# Patient Record
Sex: MALE
[De-identification: names, ages, dates, MRNs, and addresses within clinical notes are randomized per-mention and may not be internally consistent; named-entity substitution may affect disease eponyms.]

---

## 2018-01-09 PROBLEM — Z00.00 ENCOUNTER FOR PREVENTIVE HEALTH EXAMINATION: Status: ACTIVE | Noted: 2018-01-09

## 2018-01-19 ENCOUNTER — APPOINTMENT (OUTPATIENT)
Dept: ORTHOPEDIC SURGERY | Facility: CLINIC | Age: 53
End: 2018-01-19
Payer: OTHER MISCELLANEOUS

## 2018-01-19 VITALS — HEIGHT: 69 IN | WEIGHT: 225 LBS | BODY MASS INDEX: 33.33 KG/M2

## 2018-01-19 DIAGNOSIS — Z78.9 OTHER SPECIFIED HEALTH STATUS: ICD-10-CM

## 2018-01-19 DIAGNOSIS — Z86.39 PERSONAL HISTORY OF OTHER ENDOCRINE, NUTRITIONAL AND METABOLIC DISEASE: ICD-10-CM

## 2018-01-19 DIAGNOSIS — M17.32 UNILATERAL POST-TRAUMATIC OSTEOARTHRITIS, LEFT KNEE: ICD-10-CM

## 2018-01-19 DIAGNOSIS — Z80.9 FAMILY HISTORY OF MALIGNANT NEOPLASM, UNSPECIFIED: ICD-10-CM

## 2018-01-19 DIAGNOSIS — Z82.61 FAMILY HISTORY OF ARTHRITIS: ICD-10-CM

## 2018-01-19 DIAGNOSIS — Z87.39 PERSONAL HISTORY OF OTHER DISEASES OF THE MUSCULOSKELETAL SYSTEM AND CONNECTIVE TISSUE: ICD-10-CM

## 2018-01-19 DIAGNOSIS — M25.562 PAIN IN LEFT KNEE: ICD-10-CM

## 2018-01-19 PROCEDURE — 73564 X-RAY EXAM KNEE 4 OR MORE: CPT | Mod: 50

## 2018-01-19 PROCEDURE — 99204 OFFICE O/P NEW MOD 45 MIN: CPT

## 2018-02-14 ENCOUNTER — OUTPATIENT (OUTPATIENT)
Dept: OUTPATIENT SERVICES | Facility: HOSPITAL | Age: 53
LOS: 1 days | Discharge: HOME | End: 2018-02-14
Payer: OTHER MISCELLANEOUS

## 2018-02-14 VITALS
OXYGEN SATURATION: 96 % | HEIGHT: 69 IN | TEMPERATURE: 98 F | RESPIRATION RATE: 18 BRPM | DIASTOLIC BLOOD PRESSURE: 89 MMHG | WEIGHT: 235.67 LBS | SYSTOLIC BLOOD PRESSURE: 130 MMHG | HEART RATE: 67 BPM

## 2018-02-14 DIAGNOSIS — Z01.818 ENCOUNTER FOR OTHER PREPROCEDURAL EXAMINATION: ICD-10-CM

## 2018-02-14 DIAGNOSIS — Z98.890 OTHER SPECIFIED POSTPROCEDURAL STATES: Chronic | ICD-10-CM

## 2018-02-14 DIAGNOSIS — E66.9 OBESITY, UNSPECIFIED: ICD-10-CM

## 2018-02-14 DIAGNOSIS — M17.12 UNILATERAL PRIMARY OSTEOARTHRITIS, LEFT KNEE: ICD-10-CM

## 2018-02-14 DIAGNOSIS — Z90.49 ACQUIRED ABSENCE OF OTHER SPECIFIED PARTS OF DIGESTIVE TRACT: Chronic | ICD-10-CM

## 2018-02-14 DIAGNOSIS — M17.11 UNILATERAL PRIMARY OSTEOARTHRITIS, RIGHT KNEE: ICD-10-CM

## 2018-02-14 LAB
ANION GAP SERPL CALC-SCNC: 8 MMOL/L — SIGNIFICANT CHANGE UP (ref 5–17)
APTT BLD: 29.5 SEC — SIGNIFICANT CHANGE UP (ref 27.5–37.4)
BASOPHILS # BLD AUTO: 0.05 K/UL — SIGNIFICANT CHANGE UP (ref 0–0.2)
BASOPHILS NFR BLD AUTO: 0.8 % — SIGNIFICANT CHANGE UP (ref 0–2)
BUN SERPL-MCNC: 17 MG/DL — SIGNIFICANT CHANGE UP (ref 7–23)
CALCIUM SERPL-MCNC: 9.3 MG/DL — SIGNIFICANT CHANGE UP (ref 8.5–10.1)
CHLORIDE SERPL-SCNC: 104 MMOL/L — SIGNIFICANT CHANGE UP (ref 96–108)
CO2 SERPL-SCNC: 27 MMOL/L — SIGNIFICANT CHANGE UP (ref 22–31)
CREAT SERPL-MCNC: 0.92 MG/DL — SIGNIFICANT CHANGE UP (ref 0.5–1.3)
EOSINOPHIL # BLD AUTO: 0.19 K/UL — SIGNIFICANT CHANGE UP (ref 0–0.5)
EOSINOPHIL NFR BLD AUTO: 2.9 % — SIGNIFICANT CHANGE UP (ref 0–6)
GLUCOSE SERPL-MCNC: 90 MG/DL — SIGNIFICANT CHANGE UP (ref 70–99)
HBA1C BLD-MCNC: 5.3 % — SIGNIFICANT CHANGE UP (ref 4–5.6)
HCT VFR BLD CALC: 46.2 % — SIGNIFICANT CHANGE UP (ref 39–50)
HGB BLD-MCNC: 16.2 G/DL — SIGNIFICANT CHANGE UP (ref 13–17)
IMM GRANULOCYTES NFR BLD AUTO: 0.6 % — SIGNIFICANT CHANGE UP (ref 0–1.5)
INR BLD: 0.92 RATIO — SIGNIFICANT CHANGE UP (ref 0.88–1.16)
LYMPHOCYTES # BLD AUTO: 1.61 K/UL — SIGNIFICANT CHANGE UP (ref 1–3.3)
LYMPHOCYTES # BLD AUTO: 24.7 % — SIGNIFICANT CHANGE UP (ref 13–44)
MCHC RBC-ENTMCNC: 29.7 PG — SIGNIFICANT CHANGE UP (ref 27–34)
MCHC RBC-ENTMCNC: 35.1 GM/DL — SIGNIFICANT CHANGE UP (ref 32–36)
MCV RBC AUTO: 84.8 FL — SIGNIFICANT CHANGE UP (ref 80–100)
MONOCYTES # BLD AUTO: 0.54 K/UL — SIGNIFICANT CHANGE UP (ref 0–0.9)
MONOCYTES NFR BLD AUTO: 8.3 % — SIGNIFICANT CHANGE UP (ref 2–14)
NEUTROPHILS # BLD AUTO: 4.1 K/UL — SIGNIFICANT CHANGE UP (ref 1.8–7.4)
NEUTROPHILS NFR BLD AUTO: 62.7 % — SIGNIFICANT CHANGE UP (ref 43–77)
PLATELET # BLD AUTO: 232 K/UL — SIGNIFICANT CHANGE UP (ref 150–400)
POTASSIUM SERPL-MCNC: 4.2 MMOL/L — SIGNIFICANT CHANGE UP (ref 3.5–5.3)
POTASSIUM SERPL-SCNC: 4.2 MMOL/L — SIGNIFICANT CHANGE UP (ref 3.5–5.3)
PROTHROM AB SERPL-ACNC: 10 SEC — SIGNIFICANT CHANGE UP (ref 9.8–12.7)
RBC # BLD: 5.45 M/UL — SIGNIFICANT CHANGE UP (ref 4.2–5.8)
RBC # FLD: 13.2 % — SIGNIFICANT CHANGE UP (ref 10.3–14.5)
SODIUM SERPL-SCNC: 139 MMOL/L — SIGNIFICANT CHANGE UP (ref 135–145)
WBC # BLD: 6.53 K/UL — SIGNIFICANT CHANGE UP (ref 3.8–10.5)
WBC # FLD AUTO: 6.53 K/UL — SIGNIFICANT CHANGE UP (ref 3.8–10.5)

## 2018-02-14 PROCEDURE — 93010 ELECTROCARDIOGRAM REPORT: CPT | Mod: NC

## 2018-02-14 NOTE — H&P PST ADULT - NSANTHOSAYNRD_GEN_A_CORE
No. MODESTO screening performed.  STOP BANG Legend: 0-2 = LOW Risk; 3-4 = INTERMEDIATE Risk; 5-8 = HIGH Risk

## 2018-02-14 NOTE — OCCUPATIONAL THERAPY INITIAL EVALUATION ADULT - FINE MOTOR COORDINATION, FINE MOTOR COORDINATION TESTS, OT EVAL
Patient specific functional scale: Patient specific scoring scheme: 0 (unable to perform activity) ---- 5 ---- 10 (Able to perform activity at the same level as before injury or problem.) Activity: Standing ____7___, Activity: Walking ____8_____

## 2018-02-14 NOTE — H&P PST ADULT - FAMILY HISTORY
Mother  Still living? Yes, Estimated age: Age Unknown  Diabetes mellitus, Age at diagnosis: Age Unknown     Father  Still living? No  Family history of lung cancer, Age at diagnosis: Age Unknown

## 2018-02-14 NOTE — H&P PST ADULT - LYMPHATIC
anterior cervical L/supraclavicular R/posterior cervical L/posterior cervical R/anterior cervical R/supraclavicular L

## 2018-02-14 NOTE — H&P PST ADULT - HISTORY OF PRESENT ILLNESS
51yo male denies significant medical history report 2016 he injured Left knee, and had arthroscopy procedure, 1/2017, however reports still experiences left knee pain. Pt presents today for presurgical testing for Left Total Knee Replacement scheduled for 2/28/2018.
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
## 2018-02-14 NOTE — H&P PST ADULT - PROBLEM SELECTOR PLAN 1
Left Total Knee Replacement scheduled for 2/28/2018. Left Total Knee Replacement scheduled for 2/28/2018.  Pre-op instructions given by RN. Pt verbalized understanding  Chlorhexidine wash instructions given   Nasal culture instructions given  Medical clearance requested - pending same

## 2018-02-14 NOTE — OCCUPATIONAL THERAPY INITIAL EVALUATION ADULT - ADDITIONAL COMMENTS
Patient lives in a private house with a 3 steps to enter with bilateral handrails. Once inside, the patient main bedroom and bathroom is on the first floor. The patients bathroom has a tub/shower combination with a low toilet and no DME inside of the bathroom. The patients ambulates with no device and does not own any devices for ambulation. The patient is R handed, wears glasses for reading and drives. the patient daily pain is 7/10 and the patient uses alieve on bad days.

## 2018-02-14 NOTE — PHYSICAL THERAPY INITIAL EVALUATION ADULT - CRITERIA FOR SKILLED THERAPEUTIC INTERVENTIONS
rehab potential/therapy frequency/anticipated equipment needs at discharge/anticipated discharge recommendation/impairments found/functional limitations in following categories/predicted duration of therapy intervention

## 2018-02-14 NOTE — OCCUPATIONAL THERAPY INITIAL EVALUATION ADULT - MODIFIED CLINICAL TEST OF SENSORY INTEGRATION IN BALANCE TEST
Barthel Index: Feeding Score___10___, Bathing Score___5___, Grooming Score___5__, Dressing Score__10___, Bowel Score__10___, Bladder Score__10____, Toilet Score__10___, Transfer Score___15___, Mobility Score__15___, Stairs Score__10___, Total Score__100___.

## 2018-02-14 NOTE — H&P PST ADULT - MUSCULOSKELETAL
details… detailed exam no joint warmth/normal strength/decreased ROM due to pain/no joint swelling/no joint erythema/no calf tenderness

## 2018-02-14 NOTE — OCCUPATIONAL THERAPY INITIAL EVALUATION ADULT - TRANSFER SAFETY CONCERNS NOTED: SIT/STAND, REHAB EVAL
decreased balance during turns/decreased step length/decreased sequencing ability/decreased weight-shifting ability

## 2018-02-14 NOTE — PHYSICAL THERAPY INITIAL EVALUATION ADULT - MODIFIED CLINICAL TEST OF SENSORY INTEGRATION IN BALANCE TEST
Barthel Index 100 /100. 5x Sit to Stand Test : 15 seconds, 2 Minute Walk Test : 530 feet [id number] assist device.

## 2018-02-14 NOTE — H&P PST ADULT - ATTENDING COMMENTS
Pt has advanced OA on X-ray.  They have failed all forms of conservative treatment including PT and Meds and Injections as necessary.  They are her for Total Joint Replacement.

## 2018-02-15 LAB
MRSA PCR RESULT.: SIGNIFICANT CHANGE UP
S AUREUS DNA NOSE QL NAA+PROBE: DETECTED

## 2018-02-16 RX ORDER — MUPIROCIN 20 MG/G
1 OINTMENT TOPICAL
Qty: 1 | Refills: 0 | OUTPATIENT
Start: 2018-02-16 | End: 2018-02-20

## 2018-02-27 RX ORDER — OXYCODONE HYDROCHLORIDE 5 MG/1
10 TABLET ORAL EVERY 4 HOURS
Qty: 0 | Refills: 0 | Status: DISCONTINUED | OUTPATIENT
Start: 2018-02-28 | End: 2018-03-01

## 2018-02-27 RX ORDER — OXYCODONE HYDROCHLORIDE 5 MG/1
20 TABLET ORAL ONCE
Qty: 0 | Refills: 0 | Status: DISCONTINUED | OUTPATIENT
Start: 2018-02-28 | End: 2018-02-28

## 2018-02-27 RX ORDER — OXYCODONE HYDROCHLORIDE 5 MG/1
5 TABLET ORAL EVERY 4 HOURS
Qty: 0 | Refills: 0 | Status: DISCONTINUED | OUTPATIENT
Start: 2018-02-28 | End: 2018-03-01

## 2018-02-27 RX ORDER — CELECOXIB 200 MG/1
200 CAPSULE ORAL
Qty: 0 | Refills: 0 | Status: DISCONTINUED | OUTPATIENT
Start: 2018-03-01 | End: 2018-03-01

## 2018-02-27 RX ORDER — ASCORBIC ACID 60 MG
500 TABLET,CHEWABLE ORAL
Qty: 0 | Refills: 0 | Status: DISCONTINUED | OUTPATIENT
Start: 2018-02-28 | End: 2018-03-01

## 2018-02-27 RX ORDER — ONDANSETRON 8 MG/1
4 TABLET, FILM COATED ORAL EVERY 6 HOURS
Qty: 0 | Refills: 0 | Status: DISCONTINUED | OUTPATIENT
Start: 2018-02-28 | End: 2018-03-01

## 2018-02-27 RX ORDER — DOCUSATE SODIUM 100 MG
100 CAPSULE ORAL THREE TIMES A DAY
Qty: 0 | Refills: 0 | Status: DISCONTINUED | OUTPATIENT
Start: 2018-02-28 | End: 2018-03-01

## 2018-02-27 RX ORDER — ACETAMINOPHEN 500 MG
650 TABLET ORAL ONCE
Qty: 0 | Refills: 0 | Status: COMPLETED | OUTPATIENT
Start: 2018-02-28 | End: 2018-02-28

## 2018-02-27 RX ORDER — SENNA PLUS 8.6 MG/1
2 TABLET ORAL AT BEDTIME
Qty: 0 | Refills: 0 | Status: DISCONTINUED | OUTPATIENT
Start: 2018-02-28 | End: 2018-03-01

## 2018-02-27 RX ORDER — SODIUM CHLORIDE 9 MG/ML
1000 INJECTION, SOLUTION INTRAVENOUS
Qty: 0 | Refills: 0 | Status: DISCONTINUED | OUTPATIENT
Start: 2018-02-28 | End: 2018-03-01

## 2018-02-27 RX ORDER — MAGNESIUM HYDROXIDE 400 MG/1
30 TABLET, CHEWABLE ORAL DAILY
Qty: 0 | Refills: 0 | Status: DISCONTINUED | OUTPATIENT
Start: 2018-02-28 | End: 2018-03-01

## 2018-02-27 RX ORDER — POLYETHYLENE GLYCOL 3350 17 G/17G
17 POWDER, FOR SOLUTION ORAL DAILY
Qty: 0 | Refills: 0 | Status: DISCONTINUED | OUTPATIENT
Start: 2018-02-28 | End: 2018-03-01

## 2018-02-27 RX ORDER — ASPIRIN/CALCIUM CARB/MAGNESIUM 324 MG
325 TABLET ORAL
Qty: 0 | Refills: 0 | Status: DISCONTINUED | OUTPATIENT
Start: 2018-03-01 | End: 2018-03-01

## 2018-02-27 RX ORDER — MORPHINE SULFATE 50 MG/1
4 CAPSULE, EXTENDED RELEASE ORAL ONCE
Qty: 0 | Refills: 0 | Status: DISCONTINUED | OUTPATIENT
Start: 2018-02-28 | End: 2018-03-01

## 2018-02-27 RX ORDER — PANTOPRAZOLE SODIUM 20 MG/1
40 TABLET, DELAYED RELEASE ORAL DAILY
Qty: 0 | Refills: 0 | Status: DISCONTINUED | OUTPATIENT
Start: 2018-02-28 | End: 2018-03-01

## 2018-02-27 RX ORDER — ACETAMINOPHEN 500 MG
650 TABLET ORAL EVERY 6 HOURS
Qty: 0 | Refills: 0 | Status: DISCONTINUED | OUTPATIENT
Start: 2018-02-28 | End: 2018-03-01

## 2018-02-28 ENCOUNTER — INPATIENT (INPATIENT)
Facility: HOSPITAL | Age: 53
LOS: 0 days | Discharge: HOME HEALTH | End: 2018-03-01
Attending: ORTHOPAEDIC SURGERY | Admitting: ORTHOPAEDIC SURGERY
Payer: OTHER MISCELLANEOUS

## 2018-02-28 ENCOUNTER — TRANSCRIPTION ENCOUNTER (OUTPATIENT)
Age: 53
End: 2018-02-28

## 2018-02-28 ENCOUNTER — RESULT REVIEW (OUTPATIENT)
Age: 53
End: 2018-02-28

## 2018-02-28 VITALS
RESPIRATION RATE: 19 BRPM | OXYGEN SATURATION: 99 % | HEART RATE: 71 BPM | WEIGHT: 225.09 LBS | HEIGHT: 70 IN | DIASTOLIC BLOOD PRESSURE: 70 MMHG | SYSTOLIC BLOOD PRESSURE: 111 MMHG | TEMPERATURE: 97 F

## 2018-02-28 DIAGNOSIS — E55.9 VITAMIN D DEFICIENCY, UNSPECIFIED: ICD-10-CM

## 2018-02-28 DIAGNOSIS — Z98.890 OTHER SPECIFIED POSTPROCEDURAL STATES: Chronic | ICD-10-CM

## 2018-02-28 DIAGNOSIS — E66.9 OBESITY, UNSPECIFIED: ICD-10-CM

## 2018-02-28 DIAGNOSIS — S89.92XA UNSPECIFIED INJURY OF LEFT LOWER LEG, INITIAL ENCOUNTER: ICD-10-CM

## 2018-02-28 DIAGNOSIS — Z90.49 ACQUIRED ABSENCE OF OTHER SPECIFIED PARTS OF DIGESTIVE TRACT: Chronic | ICD-10-CM

## 2018-02-28 LAB
HCT VFR BLD CALC: 44 % — SIGNIFICANT CHANGE UP (ref 39–50)
HGB BLD-MCNC: 15.1 G/DL — SIGNIFICANT CHANGE UP (ref 13–17)
MCHC RBC-ENTMCNC: 29.6 PG — SIGNIFICANT CHANGE UP (ref 27–34)
MCHC RBC-ENTMCNC: 34.3 GM/DL — SIGNIFICANT CHANGE UP (ref 32–36)
MCV RBC AUTO: 86.3 FL — SIGNIFICANT CHANGE UP (ref 80–100)
NRBC # BLD: 0 /100 WBCS — SIGNIFICANT CHANGE UP (ref 0–0)
PLATELET # BLD AUTO: 182 K/UL — SIGNIFICANT CHANGE UP (ref 150–400)
RBC # BLD: 5.1 M/UL — SIGNIFICANT CHANGE UP (ref 4.2–5.8)
RBC # FLD: 12.9 % — SIGNIFICANT CHANGE UP (ref 10.3–14.5)
WBC # BLD: 8.47 K/UL — SIGNIFICANT CHANGE UP (ref 3.8–10.5)
WBC # FLD AUTO: 8.47 K/UL — SIGNIFICANT CHANGE UP (ref 3.8–10.5)

## 2018-02-28 PROCEDURE — 99223 1ST HOSP IP/OBS HIGH 75: CPT

## 2018-02-28 PROCEDURE — 88311 DECALCIFY TISSUE: CPT | Mod: 26

## 2018-02-28 PROCEDURE — 73560 X-RAY EXAM OF KNEE 1 OR 2: CPT | Mod: 26,LT

## 2018-02-28 PROCEDURE — 88305 TISSUE EXAM BY PATHOLOGIST: CPT | Mod: 26

## 2018-02-28 RX ORDER — CHOLECALCIFEROL (VITAMIN D3) 125 MCG
1000 CAPSULE ORAL DAILY
Qty: 0 | Refills: 0 | Status: DISCONTINUED | OUTPATIENT
Start: 2018-02-28 | End: 2018-03-01

## 2018-02-28 RX ORDER — DEXAMETHASONE 0.5 MG/5ML
10 ELIXIR ORAL ONCE
Qty: 0 | Refills: 0 | Status: COMPLETED | OUTPATIENT
Start: 2018-03-01 | End: 2018-03-01

## 2018-02-28 RX ORDER — TRANEXAMIC ACID 100 MG/ML
1000 INJECTION, SOLUTION INTRAVENOUS ONCE
Qty: 0 | Refills: 0 | Status: COMPLETED | OUTPATIENT
Start: 2018-02-28 | End: 2018-02-28

## 2018-02-28 RX ORDER — CEFAZOLIN SODIUM 1 G
2000 VIAL (EA) INJECTION EVERY 8 HOURS
Qty: 0 | Refills: 0 | Status: COMPLETED | OUTPATIENT
Start: 2018-02-28 | End: 2018-03-01

## 2018-02-28 RX ORDER — CELECOXIB 200 MG/1
200 CAPSULE ORAL ONCE
Qty: 0 | Refills: 0 | Status: COMPLETED | OUTPATIENT
Start: 2018-02-28 | End: 2018-02-28

## 2018-02-28 RX ORDER — INFLUENZA VIRUS VACCINE 15; 15; 15; 15 UG/.5ML; UG/.5ML; UG/.5ML; UG/.5ML
0.5 SUSPENSION INTRAMUSCULAR ONCE
Qty: 0 | Refills: 0 | Status: DISCONTINUED | OUTPATIENT
Start: 2018-02-28 | End: 2018-03-01

## 2018-02-28 RX ORDER — ACETAMINOPHEN 500 MG
1000 TABLET ORAL ONCE
Qty: 0 | Refills: 0 | Status: DISCONTINUED | OUTPATIENT
Start: 2018-02-28 | End: 2018-03-01

## 2018-02-28 RX ORDER — SODIUM CHLORIDE 9 MG/ML
1000 INJECTION, SOLUTION INTRAVENOUS
Qty: 0 | Refills: 0 | Status: DISCONTINUED | OUTPATIENT
Start: 2018-02-28 | End: 2018-02-28

## 2018-02-28 RX ADMIN — OXYCODONE HYDROCHLORIDE 10 MILLIGRAM(S): 5 TABLET ORAL at 22:37

## 2018-02-28 RX ADMIN — Medication 100 MILLIGRAM(S): at 18:37

## 2018-02-28 RX ADMIN — Medication 650 MILLIGRAM(S): at 10:58

## 2018-02-28 RX ADMIN — Medication 650 MILLIGRAM(S): at 11:00

## 2018-02-28 RX ADMIN — Medication 500 MILLIGRAM(S): at 17:08

## 2018-02-28 RX ADMIN — OXYCODONE HYDROCHLORIDE 10 MILLIGRAM(S): 5 TABLET ORAL at 17:08

## 2018-02-28 RX ADMIN — Medication 1 TABLET(S): at 17:08

## 2018-02-28 RX ADMIN — CELECOXIB 200 MILLIGRAM(S): 200 CAPSULE ORAL at 10:59

## 2018-02-28 RX ADMIN — OXYCODONE HYDROCHLORIDE 10 MILLIGRAM(S): 5 TABLET ORAL at 21:37

## 2018-02-28 RX ADMIN — OXYCODONE HYDROCHLORIDE 10 MILLIGRAM(S): 5 TABLET ORAL at 18:08

## 2018-02-28 RX ADMIN — CELECOXIB 200 MILLIGRAM(S): 200 CAPSULE ORAL at 11:01

## 2018-02-28 RX ADMIN — OXYCODONE HYDROCHLORIDE 20 MILLIGRAM(S): 5 TABLET ORAL at 11:01

## 2018-02-28 RX ADMIN — SODIUM CHLORIDE 125 MILLILITER(S): 9 INJECTION, SOLUTION INTRAVENOUS at 14:54

## 2018-02-28 RX ADMIN — PANTOPRAZOLE SODIUM 40 MILLIGRAM(S): 20 TABLET, DELAYED RELEASE ORAL at 17:08

## 2018-02-28 RX ADMIN — OXYCODONE HYDROCHLORIDE 20 MILLIGRAM(S): 5 TABLET ORAL at 10:59

## 2018-02-28 RX ADMIN — Medication 100 MILLIGRAM(S): at 21:51

## 2018-02-28 RX ADMIN — TRANEXAMIC ACID 220 MILLIGRAM(S): 100 INJECTION, SOLUTION INTRAVENOUS at 14:54

## 2018-02-28 NOTE — PROGRESS NOTE ADULT - SUBJECTIVE AND OBJECTIVE BOX
52yMale s/p L TKA POD#0. Pt seen and examined in NAD. Pain controlled. Pt denies any new complaints. Pt denies CP/SOB/N/V/D/numbness/tingling/bowel or bladder dysfunction. +void     PE:   LLE: dressing c/d/i. +ROM ankle/toes. Calf: soft, compressible and nontender. DP/PT 2+ NVI.                           15.1   8.47  )-----------( 182      ( 28 Feb 2018 14:24 )             44.0         A/P: 52yMale s/p [sx].  Pain controlled  PT: WBAT   DVT ppx: SCDs/ASA 325mg BID   Wound care, Isometric exercises, incentive spirometry   Discharge: planning for home tomorrow  All the above discussed and understood by pt

## 2018-02-28 NOTE — BRIEF OPERATIVE NOTE - PROCEDURE
<<-----Click on this checkbox to enter Procedure Left total knee replacement  02/28/2018    Active  [id number]

## 2018-02-28 NOTE — PHYSICAL THERAPY INITIAL EVALUATION ADULT - GENERAL OBSERVATIONS, REHAB EVAL
Pt on room air. IV. Pt received supine in bed. NAD. Pt left seated in chair with all needs in reach.

## 2018-02-28 NOTE — PHYSICAL THERAPY INITIAL EVALUATION ADULT - MODIFIED CLINICAL TEST OF SENSORY INTEGRATION IN BALANCE TEST
Barthel Index: Feeding Score 10, Bathing Score 5, Grooming Score 5, Dressing Score 5, Bowels Score 10, Bladder Score 10, Toilet Score 10, Transfers Score 10 Mobility Score 10, Stairs Score 5,     Total Score 80
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
## 2018-02-28 NOTE — PHYSICAL THERAPY INITIAL EVALUATION ADULT - MANUAL MUSCLE TESTING RESULTS, REHAB EVAL
Bilateral UE/ R LE =5/5  L ankle dorsiflexion-plantarflexion 5/5. L knee extension 3/5, not formally tested due to pain.

## 2018-02-28 NOTE — PHYSICAL THERAPY INITIAL EVALUATION ADULT - ACTIVE RANGE OF MOTION EXAMINATION, REHAB EVAL
L knee ROM -10 - 95 deg./RLE Active ROM was WNL (within normal limits)/geno. upper extremity Active ROM was WNL (within normal limits)

## 2018-03-01 ENCOUNTER — TRANSCRIPTION ENCOUNTER (OUTPATIENT)
Age: 53
End: 2018-03-01

## 2018-03-01 VITALS
HEART RATE: 84 BPM | SYSTOLIC BLOOD PRESSURE: 119 MMHG | OXYGEN SATURATION: 94 % | DIASTOLIC BLOOD PRESSURE: 61 MMHG | RESPIRATION RATE: 15 BRPM | TEMPERATURE: 98 F

## 2018-03-01 DIAGNOSIS — D72.828 OTHER ELEVATED WHITE BLOOD CELL COUNT: ICD-10-CM

## 2018-03-01 LAB
ANION GAP SERPL CALC-SCNC: 8 MMOL/L — SIGNIFICANT CHANGE UP (ref 5–17)
BUN SERPL-MCNC: 13 MG/DL — SIGNIFICANT CHANGE UP (ref 7–23)
CALCIUM SERPL-MCNC: 8.4 MG/DL — LOW (ref 8.5–10.1)
CHLORIDE SERPL-SCNC: 104 MMOL/L — SIGNIFICANT CHANGE UP (ref 96–108)
CO2 SERPL-SCNC: 24 MMOL/L — SIGNIFICANT CHANGE UP (ref 22–31)
CREAT SERPL-MCNC: 0.99 MG/DL — SIGNIFICANT CHANGE UP (ref 0.5–1.3)
GLUCOSE SERPL-MCNC: 105 MG/DL — HIGH (ref 70–99)
HCT VFR BLD CALC: 38.3 % — LOW (ref 39–50)
HGB BLD-MCNC: 13.3 G/DL — SIGNIFICANT CHANGE UP (ref 13–17)
MCHC RBC-ENTMCNC: 29.4 PG — SIGNIFICANT CHANGE UP (ref 27–34)
MCHC RBC-ENTMCNC: 34.7 GM/DL — SIGNIFICANT CHANGE UP (ref 32–36)
MCV RBC AUTO: 84.7 FL — SIGNIFICANT CHANGE UP (ref 80–100)
NRBC # BLD: 0 /100 WBCS — SIGNIFICANT CHANGE UP (ref 0–0)
PLATELET # BLD AUTO: 213 K/UL — SIGNIFICANT CHANGE UP (ref 150–400)
POTASSIUM SERPL-MCNC: 4.1 MMOL/L — SIGNIFICANT CHANGE UP (ref 3.5–5.3)
POTASSIUM SERPL-SCNC: 4.1 MMOL/L — SIGNIFICANT CHANGE UP (ref 3.5–5.3)
RBC # BLD: 4.52 M/UL — SIGNIFICANT CHANGE UP (ref 4.2–5.8)
RBC # FLD: 12.7 % — SIGNIFICANT CHANGE UP (ref 10.3–14.5)
SODIUM SERPL-SCNC: 136 MMOL/L — SIGNIFICANT CHANGE UP (ref 135–145)
WBC # BLD: 11.8 K/UL — HIGH (ref 3.8–10.5)
WBC # FLD AUTO: 11.8 K/UL — HIGH (ref 3.8–10.5)

## 2018-03-01 PROCEDURE — 99233 SBSQ HOSP IP/OBS HIGH 50: CPT

## 2018-03-01 RX ORDER — ASCORBIC ACID 60 MG
1 TABLET,CHEWABLE ORAL
Qty: 0 | Refills: 0 | DISCHARGE
Start: 2018-03-01

## 2018-03-01 RX ORDER — ACETAMINOPHEN 500 MG
2 TABLET ORAL
Qty: 0 | Refills: 0 | DISCHARGE
Start: 2018-03-01

## 2018-03-01 RX ORDER — MAGNESIUM HYDROXIDE 400 MG/1
30 TABLET, CHEWABLE ORAL
Qty: 0 | Refills: 0 | DISCHARGE
Start: 2018-03-01

## 2018-03-01 RX ORDER — ASPIRIN/CALCIUM CARB/MAGNESIUM 324 MG
1 TABLET ORAL
Qty: 60 | Refills: 0
Start: 2018-03-01 | End: 2018-03-30

## 2018-03-01 RX ORDER — DOCUSATE SODIUM 100 MG
1 CAPSULE ORAL
Qty: 0 | Refills: 0 | DISCHARGE
Start: 2018-03-01

## 2018-03-01 RX ORDER — OXYCODONE HYDROCHLORIDE 5 MG/1
1 TABLET ORAL
Qty: 30 | Refills: 0
Start: 2018-03-01 | End: 2018-03-05

## 2018-03-01 RX ORDER — CELECOXIB 200 MG/1
1 CAPSULE ORAL
Qty: 60 | Refills: 0
Start: 2018-03-01 | End: 2018-03-30

## 2018-03-01 RX ORDER — PANTOPRAZOLE SODIUM 20 MG/1
1 TABLET, DELAYED RELEASE ORAL
Qty: 30 | Refills: 0
Start: 2018-03-01 | End: 2018-03-30

## 2018-03-01 RX ADMIN — Medication 1000 UNIT(S): at 11:35

## 2018-03-01 RX ADMIN — CELECOXIB 200 MILLIGRAM(S): 200 CAPSULE ORAL at 09:05

## 2018-03-01 RX ADMIN — OXYCODONE HYDROCHLORIDE 5 MILLIGRAM(S): 5 TABLET ORAL at 09:05

## 2018-03-01 RX ADMIN — Medication 1 TABLET(S): at 11:35

## 2018-03-01 RX ADMIN — PANTOPRAZOLE SODIUM 40 MILLIGRAM(S): 20 TABLET, DELAYED RELEASE ORAL at 11:35

## 2018-03-01 RX ADMIN — SODIUM CHLORIDE 100 MILLILITER(S): 9 INJECTION, SOLUTION INTRAVENOUS at 03:07

## 2018-03-01 RX ADMIN — Medication 100 MILLIGRAM(S): at 05:35

## 2018-03-01 RX ADMIN — Medication 100 MILLIGRAM(S): at 03:07

## 2018-03-01 RX ADMIN — CELECOXIB 200 MILLIGRAM(S): 200 CAPSULE ORAL at 15:46

## 2018-03-01 RX ADMIN — OXYCODONE HYDROCHLORIDE 5 MILLIGRAM(S): 5 TABLET ORAL at 15:46

## 2018-03-01 RX ADMIN — Medication 500 MILLIGRAM(S): at 05:35

## 2018-03-01 RX ADMIN — Medication 100 MILLIGRAM(S): at 15:46

## 2018-03-01 RX ADMIN — POLYETHYLENE GLYCOL 3350 17 GRAM(S): 17 POWDER, FOR SOLUTION ORAL at 11:35

## 2018-03-01 RX ADMIN — Medication 102 MILLIGRAM(S): at 05:35

## 2018-03-01 RX ADMIN — Medication 325 MILLIGRAM(S): at 05:35

## 2018-03-01 NOTE — DISCHARGE NOTE ADULT - PATIENT PORTAL LINK FT
You can access the [facility] Patient Portal, offered by [facility], by registering with the following website: [email or web address]

## 2018-03-01 NOTE — OCCUPATIONAL THERAPY INITIAL EVALUATION ADULT - LIVES WITH, PROFILE
in a private house with 4 wide steps to negotiate . Pt living quarters are on the main level. The bathroom has a tub and shower combination , standard toilet and no grab bars/spouse

## 2018-03-01 NOTE — DISCHARGE NOTE ADULT - MEDICATION SUMMARY - MEDICATIONS TO TAKE
I will START or STAY ON the medications listed below when I get home from the hospital:    Marino Red  -- 1 tab(s) by mouth once a day  -- Indication: For LEFT TOTAL KNEE ARTHROPLASTY    acetaminophen 325 mg oral tablet  -- 2 tab(s) by mouth every 6 hours, As needed, For Temp over 38.3 C (100.94 F)  -- Indication: For LEFT TOTAL KNEE ARTHROPLASTY    celecoxib 200 mg oral capsule  -- 1 cap(s) by mouth 2 times a day (before meals) MDD:2 Tabs  -- Indication: For LEFT TOTAL KNEE ARTHROPLASTY    oxyCODONE 10 mg oral tablet  -- 1 tab(s) by mouth every 4 hours, As needed, Moderate Pain MDD:6 Tabs  -- Indication: For LEFT TOTAL KNEE ARTHROPLASTY    aspirin 325 mg oral delayed release tablet  -- 1 tab(s) by mouth 2 times a day MDD:2 Tabs  -- Indication: For LEFT TOTAL KNEE ARTHROPLASTY    magnesium hydroxide 8% oral suspension  -- 30 milliliter(s) by mouth once a day, As needed, Constipation  -- Indication: For LEFT TOTAL KNEE ARTHROPLASTY    docusate sodium 100 mg oral capsule  -- 1 cap(s) by mouth 3 times a day  -- Indication: For LEFT TOTAL KNEE ARTHROPLASTY    CoQ10  -- 1 cap(s) by mouth once a day  -- Indication: For LEFT TOTAL KNEE ARTHROPLASTY    pantoprazole 40 mg oral delayed release tablet  -- 1 tab(s) by mouth once a day MDD:1 Tab  -- Indication: For LEFT TOTAL KNEE ARTHROPLASTY    Multiple Vitamins oral tablet  -- 1 tab(s) by mouth once a day  -- Indication: For LEFT TOTAL KNEE ARTHROPLASTY    Vitamin D3 1000 intl units oral capsule  -- 1 cap(s) by mouth once a day  -- Indication: For LEFT TOTAL KNEE ARTHROPLASTY    ascorbic acid 500 mg oral tablet  -- 1 tab(s) by mouth 2 times a day  -- Indication: For LEFT TOTAL KNEE ARTHROPLASTY

## 2018-03-01 NOTE — DISCHARGE NOTE ADULT - NS AS ACTIVITY OBS
Walking-Indoors allowed/Do not drive or operate machinery/Stairs allowed/Showering allowed/Walking-Outdoors allowed/No Heavy lifting/straining

## 2018-03-01 NOTE — PROGRESS NOTE ADULT - SUBJECTIVE AND OBJECTIVE BOX
Patient is a 52y old  Male who presents with a chief complaint of Left Knee Pain (01 Mar 2018 08:45)      INTERVAL HPI/OVERNIGHT EVENTS:  feels well, pain in adequate control    MEDICATIONS  (STANDING):  acetaminophen  IVPB. 1000 milliGRAM(s) IV Intermittent once  ascorbic acid 500 milliGRAM(s) Oral two times a day  aspirin enteric coated 325 milliGRAM(s) Oral two times a day  celecoxib 200 milliGRAM(s) Oral two times a day before meals  cholecalciferol 1000 Unit(s) Oral daily  docusate sodium 100 milliGRAM(s) Oral three times a day  influenza   Vaccine 0.5 milliLiter(s) IntraMuscular once  lactated ringers. 1000 milliLiter(s) (100 mL/Hr) IV Continuous <Continuous>  morphine  - Injectable 4 milliGRAM(s) IV Push once  multivitamin 1 Tablet(s) Oral daily  pantoprazole    Tablet 40 milliGRAM(s) Oral daily  polyethylene glycol 3350 17 Gram(s) Oral daily    MEDICATIONS  (PRN):  acetaminophen   Tablet 650 milliGRAM(s) Oral every 6 hours PRN For Temp over 38.3 C (100.94 F)  aluminum hydroxide/magnesium hydroxide/simethicone Suspension 30 milliLiter(s) Oral four times a day PRN Indigestion  magnesium hydroxide Suspension 30 milliLiter(s) Oral daily PRN Constipation  ondansetron Injectable 4 milliGRAM(s) IV Push every 6 hours PRN Nausea and/or Vomiting  oxyCODONE    IR 5 milliGRAM(s) Oral every 4 hours PRN Mild Pain  oxyCODONE    IR 10 milliGRAM(s) Oral every 4 hours PRN Moderate Pain  senna 2 Tablet(s) Oral at bedtime PRN Constipation      Allergies    No Known Allergies    Intolerances        REVIEW OF SYSTEMS:  CONSTITUTIONAL: No fever, weight loss, + fatigue  EYES: No eye pain, visual disturbances, or discharge  ENMT:  No difficulty hearing, tinnitus, vertigo; No sinus or throat pain  RESPIRATORY: No cough, wheezing, chills or hemoptysis; No shortness of breath  CARDIOVASCULAR: No chest pain, palpitations, dizziness, or leg swelling  GASTROINTESTINAL: No abdominal or epigastric pain. No nausea, vomiting, or hematemesis; No diarrhea or constipation. No melena or hematochezia.  GENITOURINARY: No dysuria, frequency, hematuria, or incontinence  NEUROLOGICAL: No headaches, memory loss, loss of strength, numbness, or tremors  SKIN: No itching, burning, rashes, or lesions   MUSCULOSKELETAL: No joint pain or swelling; No muscle, back,   left knee pain in control      Vital Signs Last 24 Hrs  T(C): 36.6 (01 Mar 2018 13:42), Max: 37.1 (01 Mar 2018 09:02)  T(F): 97.8 (01 Mar 2018 13:42), Max: 98.8 (01 Mar 2018 09:02)  HR: 84 (01 Mar 2018 13:42) (80 - 109)  BP: 119/61 (01 Mar 2018 13:42) (119/61 - 159/99)  BP(mean): --  RR: 15 (01 Mar 2018 13:42) (15 - 16)  SpO2: 94% (01 Mar 2018 13:42) (93% - 98%)    PHYSICAL EXAM:  GENERAL: NAD, well-groomed, well-developed  HEAD:  Atraumatic, Normocephalic  EYES: EOMI, PERRLA, conjunctiva and sclera clear  ENMT: No tonsillar erythema, exudates, or enlargement; Moist mucous membranes,  NECK: Supple, No JVD, Normal thyroid  NERVOUS SYSTEM:  Alert & Oriented X3, Good concentration; Motor Strength 5/5 B/L upper and lower extremities; DTRs 2+ intact and symmetric  CHEST/LUNG: Clear to percussion bilaterally; No rales, rhonchi, wheezing, or rubs  HEART: Regular rate and rhythm; No murmurs, rubs, or gallops  ABDOMEN: Soft, Nontender, Nondistended; Bowel sounds present  EXTREMITIES:  2+ Peripheral Pulses, left knee c/d/i, mild edema  SKIN: No rashes or lesions    LABS:                        13.3   11.80 )-----------( 213      ( 01 Mar 2018 06:22 )             38.3     03-01    136  |  104  |  13  ----------------------------<  105<H>  4.1   |  24  |  0.99    Ca    8.4<L>      01 Mar 2018 06:22          CAPILLARY BLOOD GLUCOSE          RADIOLOGY & ADDITIONAL TESTS:    Imaging Personally Reviewed:  [ ] YES  [ ] NO    Consultant(s) Notes Reviewed:  [ ] YES  [ ] NO    Care Discussed with Consultants/Other Providers [x ] YES  [ ] NO

## 2018-03-01 NOTE — DISCHARGE NOTE ADULT - ADDITIONAL INSTRUCTIONS
Please call your MD, if you have new onset of fevers, increased drainage, increased pain or increased redness around the incision site. Please return to the Emergency Department if you have chest pain or shortness of breath.

## 2018-03-01 NOTE — DISCHARGE NOTE ADULT - CARE PROVIDER_API CALL
Pacheco Cartagena), Orthopaedic Surgery  [address] [place] [address]  Phone: [phone]  Fax: [phone]

## 2018-03-01 NOTE — OCCUPATIONAL THERAPY INITIAL EVALUATION ADULT - GENERAL OBSERVATIONS, REHAB EVAL
Pt was seen for initial OT consult encountered supine in bed with spouse at bedside. Pt was AA&Ox4, cooperative & followed commands. Left knee and staples are intact . Pt c/o pain, stiffness & decreased ROM in left  knee due to TKR; these deficits limit pt's ADL management, balance & functional mobility.

## 2018-03-01 NOTE — OCCUPATIONAL THERAPY INITIAL EVALUATION ADULT - SOCIAL CONCERNS
Pt voiced concerns  about the side effect of his pain medications. Pt has the support of his wife to assist him upon discharge home./Complex psychosocial needs/coping issues

## 2018-03-01 NOTE — OCCUPATIONAL THERAPY INITIAL EVALUATION ADULT - RANGE OF MOTION EXAMINATION, LOWER EXTREMITY
Right LE Passive ROM was WNL (within normal limits)/AROM in left knee is 0-40 degrees/Right LE Active ROM was WNL(within normal limits)

## 2018-03-01 NOTE — DISCHARGE NOTE ADULT - CARE PLAN
Principal Discharge DX:	Injury of left knee, initial encounter  Goal:	Improve Function, Decrease Pain  Assessment and plan of treatment:	Keep Prineo Dressing Clean, Dry and Intact. May shower with Prineo Dressing. Please do not scrub, soak, peel or pick at the prineo dressing. No creams, lotions, or oils over dressing. May shower and let water run over incision, no baths. Pat dry once out of shower. Dressing to be removed in office at follow up visit in 2 weeks.

## 2018-03-01 NOTE — DISCHARGE NOTE ADULT - MEDICATION SUMMARY - MEDICATIONS TO STOP TAKING
I will STOP taking the medications listed below when I get home from the hospital:    mupirocin 2% topical ointment  -- Apply on skin to affected area 2 times a day   Intranasaly  -- For external use only.

## 2018-03-01 NOTE — OCCUPATIONAL THERAPY INITIAL EVALUATION ADULT - ADDITIONAL COMMENTS
Prior to admission, pt was functioning in his  roles, self sufficient & ambulating independently without any assistive devices.  Presently, pt needs assistance with IADL ; pt is able to dress lower body independently with supervision after set up. The scale below depicts a picture of the pt's current level of functioning. Barthel Index: Feeding Score___10___, Bathing Score__0____, Grooming Score_5____, Dressing Score__10___, Bowel Score__10___, Bladder Score__10____, Toilet Score__10___, Transfer Score____10__, Mobility Score__10___, Stairs Score___5__, Total Score___85/100__.

## 2018-03-01 NOTE — PROGRESS NOTE ADULT - SUBJECTIVE AND OBJECTIVE BOX
POD#1 s/p Left TKA   52yMale Patient seen and examined with Dr. Cartagena, Pain controlled  Patient Denies SOB, CP, N/V/D       PE: Left Knee/LE: Dressing C/D/I, Sensation/motor intact, DP 2+, FROM ankle/toes         B/L LE: Skin intact. +ROM hip/knee/ankle/toes. Ankle Dorsi/plantarflexion: 5/5. Calf: soft, compressible and nontender. DP/PT 2+ NVI.                             13.3   11.80 )-----------( 213      ( 01 Mar 2018 06:22 )             38.3       03-01    136  |  104  |  13  ----------------------------<  105<H>  4.1   |  24  |  0.99    Ca    8.4<L>      01 Mar 2018 06:22          A: As above   P: Pain Control       DVT Prophylaxis      Incentive spirometry      PT WBAT LLE      Isometric exercises      Discharge Planning      All the above discussed and understood by pt       Ortho to F/U

## 2018-03-01 NOTE — OCCUPATIONAL THERAPY INITIAL EVALUATION ADULT - PLANNED THERAPY INTERVENTIONS, OT EVAL
neuromuscular re-education/IADL retraining/bed mobility training/parent/caregiver training.../strengthening/ROM/transfer training/energy conservation techniques/ADL retraining/balance training

## 2018-03-01 NOTE — DISCHARGE NOTE ADULT - PLAN OF CARE
Improve Function, Decrease Pain Keep Prineo Dressing Clean, Dry and Intact. May shower with Prineo Dressing. Please do not scrub, soak, peel or pick at the prineo dressing. No creams, lotions, or oils over dressing. May shower and let water run over incision, no baths. Pat dry once out of shower. Dressing to be removed in office at follow up visit in 2 weeks.

## 2018-03-01 NOTE — DISCHARGE NOTE ADULT - HOSPITAL COURSE
52yMale with history of Left Knee Pain presenting for Left TKA by Dr. Cartagena on 2/28/18. Risk and benefits of surgery were explained to the patient. The patient understood and agreed to proceed with surgery. Patient underwent the procedure with no intraoperative complications. Pt was brought in stable condition to the PACU. Once stable in PACU, pt was brought to the floor. During hospital stay pt was followed by Medicine, [social work or home care] during this admission. Pt had an uneventful hospital course. Patient Discharged Home with Home Care Services and PT

## 2018-03-05 DIAGNOSIS — M17.12 UNILATERAL PRIMARY OSTEOARTHRITIS, LEFT KNEE: ICD-10-CM

## 2018-03-05 DIAGNOSIS — Z80.2 FAMILY HISTORY OF MALIGNANT NEOPLASM OF OTHER RESPIRATORY AND INTRATHORACIC ORGANS: ICD-10-CM

## 2018-03-05 DIAGNOSIS — M51.26 OTHER INTERVERTEBRAL DISC DISPLACEMENT, LUMBAR REGION: ICD-10-CM

## 2018-03-05 DIAGNOSIS — E78.5 HYPERLIPIDEMIA, UNSPECIFIED: ICD-10-CM

## 2018-03-05 DIAGNOSIS — Z96.698 PRESENCE OF OTHER ORTHOPEDIC JOINT IMPLANTS: ICD-10-CM

## 2018-03-05 DIAGNOSIS — J30.2 OTHER SEASONAL ALLERGIC RHINITIS: ICD-10-CM

## 2018-03-05 DIAGNOSIS — E66.9 OBESITY, UNSPECIFIED: ICD-10-CM

## 2018-03-05 DIAGNOSIS — Z90.49 ACQUIRED ABSENCE OF OTHER SPECIFIED PARTS OF DIGESTIVE TRACT: ICD-10-CM

## 2018-03-05 DIAGNOSIS — M54.89 OTHER DORSALGIA: ICD-10-CM

## 2018-03-05 DIAGNOSIS — Z82.2 FAMILY HISTORY OF DEAFNESS AND HEARING LOSS: ICD-10-CM

## 2018-03-05 LAB — SURGICAL PATHOLOGY FINAL REPORT - CH: SIGNIFICANT CHANGE UP

## 2020-03-02 NOTE — H&P PST ADULT - PULMONARY EMBOLUS
Subjective   Patient ID: Yung is a 19 month old male who is accompanied by:mother     Chief Complaint   Patient presents with   • Follow-up     ear infection and pink eye   • Rash     taking Amox        Developed rash and diarrhea after 2 doses of Augmentin given by urgent care 2 days ago.for otitis media.his rash diappered after mother stopped medication and gave Benadryl.    Review of Systems   Gastrointestinal: Positive for diarrhea.   Skin: Positive for rash.   All other systems reviewed and are negative.      Patient's medications, allergies, past medical, surgical, social and family histories were reviewed and updated as appropriate.    Objective   Vitals:Temp 97.8 °F (36.6 °C) (Temporal)   Wt 13.5 kg (29 lb 11.2 oz)   Physical Exam   Constitutional: He is active.   HENT:   Right Ear: Tympanic membrane normal.   Left Ear: Tympanic membrane normal.   Nose: No nasal discharge.   Mouth/Throat: No tonsillar exudate. Oropharynx is clear. Pharynx is normal.   Neck: Neck supple. No neck adenopathy.   Cardiovascular: S1 normal and S2 normal.   No murmur heard.  Pulmonary/Chest: Breath sounds normal. No nasal flaring. No respiratory distress. He has no wheezes.   Abdominal: There is no hepatosplenomegaly. There is no tenderness. Musculoskeletal: Normal range of motion.     Neurological: He is alert. Coordination normal.   Skin: Skin is warm.   No rashes noted at this time.   Nursing note and vitals reviewed.      Assessment   Problem List Items Addressed This Visit     None      Visit Diagnoses     Drug side effects    -  Primary        Supportive care .   benadryl as needed.  Instructed to call if problem worsens or does not improve within the next 24 hours otherwise follow-up in 2 weeks  
no

## 2021-09-22 NOTE — ASU PREOP CHECKLIST - SIDE RAILS UP
89 y/o male pmhx HTN, IDDM, Afib on coumadin, GERD, CAD s/p stents, recent admission w/ SDH on 9/7 s/p fall. Patient was discharged and AC was restarted on 9/14. Now presenting to ED w/ worsening mental status over past few days. Patient was code stroke.    Impression:  1. Acute ICH   2. Hypertensive emergency   3. Afib on coumadin   4. HTN  5. DM2      Plan:  ICU  - Keppra 500mg BID for seizure ppx  - Continue BBX, Imdur, and Norvasc for HTN  - Afib, rate controlled. Hold AC. D/W Cardio and he should remained off AC  - Po Diet  - SCDs for DVT ppx  - Insulin sliding scale     Transfer to reg floor         n/a

## 2022-03-27 PROBLEM — E66.9 OBESITY, UNSPECIFIED: Chronic | Status: ACTIVE | Noted: 2018-02-14

## 2022-03-27 PROBLEM — M19.90 UNSPECIFIED OSTEOARTHRITIS, UNSPECIFIED SITE: Chronic | Status: ACTIVE | Noted: 2018-02-14

## 2022-04-07 ENCOUNTER — APPOINTMENT (OUTPATIENT)
Dept: ORTHOPEDIC SURGERY | Facility: CLINIC | Age: 57
End: 2022-04-07
Payer: OTHER MISCELLANEOUS

## 2022-04-07 VITALS — HEIGHT: 69 IN | BODY MASS INDEX: 33.33 KG/M2 | WEIGHT: 225 LBS

## 2022-04-07 DIAGNOSIS — M70.21 OLECRANON BURSITIS, RIGHT ELBOW: ICD-10-CM

## 2022-04-07 PROCEDURE — 99214 OFFICE O/P EST MOD 30 MIN: CPT

## 2022-04-07 PROCEDURE — 99072 ADDL SUPL MATRL&STAF TM PHE: CPT

## 2022-04-07 NOTE — H&P PST ADULT - FUNCTIONAL SCREEN CURRENT LEVEL: DRESSING, MLM
4 [facility].,    Adult Hospitalist      Patient ID: Caar Downs  MRN: [id number]     Acct:  [de-identified]       Patient's PCP: Stacie Urias MD    Admit Date: 4/6/2022     Discharge Date:   4/7/2022    Admitting Physician: Juvencio Hairston MD    Discharge Physician: Regla Tomas MD     CONSULTANTS: Patient Care Team:  Stacie Urias MD as PCP - General        Active Discharge Diagnoses:  Fluid overload  End-stage renal disease on hemodialysis, missed hemodialysis  Essential hypertension  Hyperlipidemia, mixed  Diabetes mellitus type 2  Anemia of chronic disease  Morbid obesity        Hospital Course:    Cara Downs is a 66 y.o.  female who presents with Fatigue and Shortness of Breath  This is a 64-year-old female has been admitted via emergency room, patient came to the ER with a complaint of having problem with her dialysis catheter, patient stated that she went to the dialysis and they were unable to access her port, for this reason patient was sent to the emergency room, patient is not dialysis on Wednesday due to transportation issues so she has missed 2 dialysis, further patient is noted to be fluid overloaded and complaining of shortness of breath, admitted for further management  Patient was admitted for further questioning with family and patient again the said that the problem with the dialysis catheter was sorted out but since she was not feeling well was sent to the emergency room, patient was noticed to be in fluid overload nephrology evaluate the patient patient underwent emergent hemodialysis, and felt better after the fluid was removed she is feeling better back to baseline was told to continue with hemodialysis as outpatient, discharge from the hospital stable condition    The plan was discussed in detail with patient who agreed with the plan and verbalized understanding .     The patient was seen and examined on day of discharge and this discharge summary is in conjunction with any daily progress note from day of discharge. Hospital Data:    Labs:    Hematology:  Recent Labs     04/06/22  1409 04/07/22  0650   WBC 9.5 10.1   RBC 3.57* 3.39*   HGB 10.4* 9.7*   HCT 34.5* 31.1*   MCV 96.6 91.7   MCH 29.1 28.6   MCHC 30.1 31.2   RDW 15.5* 15.2*    188   MPV 11.9 10.8   INR  --  1.0     Chemistry:  Recent Labs     04/06/22  1409 04/06/22  1613 04/07/22  0650     --  136   K 5.0  --  3.8     --  101   CO2 16*  --  21   GLUCOSE 192*  --  96   BUN 57*  --  25*   CREATININE 7.56*  --  4.84*   ANIONGAP 20*  --  14   LABGLOM 5*  --  9*   GFRAA 6*  --  11*   CALCIUM 9.8  --  9.2   PROBNP 6,179*  --   --    TROPHS 97* 96*  --      Recent Labs     04/06/22  1409 04/06/22  1730 04/06/22  1932 04/07/22  0629 04/07/22  1151   PROT 8.3  --   --   --   --    LABALBU 4.4  --   --   --   --    LABA1C 5.5  --   --   --   --    AST 21  --   --   --   --    ALT 9  --   --   --   --    ALKPHOS 139*  --   --   --   --    BILITOT 0.42  --   --   --   --    POCGLU  --  158* 120* 93 130*     Lab Results   Component Value Date    INR 1.0 04/07/2022    INR 1.1 03/18/2022    INR 1.0 01/28/2022    PROTIME 13.6 04/07/2022    PROTIME 13.8 03/18/2022    PROTIME 10.5 01/28/2022     Lab Results   Component Value Date/Time    SPECIAL NOT REPORTED 01/28/2022 10:14 PM     Lab Results   Component Value Date/Time    CULTURE NO GROWTH 01/28/2022 10:14 PM       Lab Results   Component Value Date    FIO2 UNKNOWN 01/28/2022       Radiology:    XR CHEST PORTABLE    Result Date: 4/6/2022  Interval improvement patchy COVID-19 related airspace abnormalities with some residual opacities left mid and both lung bases and probable bibasilar atelectasis or scarring. Cardiomegaly but no radiographic CHF. Additional findings, as above.          All radiological studies reviewed      Reviews of Symptoms:    A 10 point system is reviewed and  negative except described in hospital course    Physical Exam:    Vitals:  /64   Pulse 85   Temp 98 °F (36.7 °C) (Oral)   Resp 16   Ht 5' 3\" (1.6 m)   Wt 163 lb 4.8 oz (74.1 kg)   SpO2 97%   BMI 28.93 kg/m²   Temp (24hrs), Av.2 °F (36.8 °C), Min:97.9 °F (36.6 °C), Max:98.9 °F (37.2 °C)      General appearance - alert, well appearing, and in no acute distress  Mental status - oriented to person, place, and time with normal affect  Head - normocephalic and atraumatic  Eyes - pupils equal and reactive, extraocular eye movements intact, conjunctiva clear  Ears - hearing appears to be intact  Nose - no drainage noted  Mouth - mucous membranes moist  Neck - supple, no carotid bruits, thyroid not palpable  Chest - clear to auscultation, normal effort  Heart - normal rate, regular rhythm, no murmur  Abdomen - soft, nontender, nondistended, bowel sounds present all four quadrants, no masses, hepatomegaly or splenomegaly  Neurological - normal speech, no focal findings or movement disorder noted, cranial nerves II through XII grossly intact  Extremities - peripheral pulses palpable, no pedal edema or calf pain with palpation  Skin - no gross lesions, rashes, or induration noted      Consults:  IP CONSULT TO INTERNAL MEDICINE  IP CONSULT TO NEPHROLOGY  IP CONSULT TO NEPHROLOGY    Disposition: Home    Discharged Condition: Stable    Follow Up: No follow-up provider specified.     Lab Frequency Next Occurrence   CBC Auto Differential Once a week    Basic Metabolic Panel Once a week    Up with assistance PRN    Intake and output EVERY 8 HOURS    Initiate Oxygen Therapy Protocol PRN    Pulse Oximetry Spot Check PRN    Basic Metabolic Panel w/ Reflex to MG DAILY    CBC auto differential DAILY    POCT glucose 4 TIMES DAILY BEFORE MEALS & AT BEDTIME    POCT glucose - If patient is NPO, TPN, or continuous tube feed and on HumaLOG NOW THEN EVERY 4 HOURS    HYPOGLYCEMIA TREATMENT: blood glucose less than 50 mg/dL and patient  ALERT and TOLERATING PO PRN HYPOGLYCEMIA TREATMENT: blood glucose less than 70 mg/dL and patient ALERT and TOLERATING PO PRN    HYPOGLYCEMIA TREATMENT: blood glucose less than 70 mg/dL and patient NOT ALERT or NPO PRN    POCT Glucose PRN          Diet: ADULT DIET; Regular    Discharge Medications:      Medication List      CONTINUE taking these medications    citalopram 20 MG tablet  Commonly known as: CELEXA     cloNIDine 0.1 MG/24HR Ptwk  Commonly known as: CATAPRES     clopidogrel 75 MG tablet  Commonly known as: PLAVIX     Crestor 5 MG tablet  Generic drug: rosuvastatin     enalapril 5 MG tablet  Commonly known as: VASOTEC     metoprolol 100 MG tablet  Commonly known as: LOPRESSOR     NIFEdipine 60 MG extended release tablet  Commonly known as: PROCARDIA XL     pantoprazole 40 MG tablet  Commonly known as: PROTONIX     sevelamer 800 MG tablet  Commonly known as: RENVELA  Take 1 tablet by mouth 3 times daily (with meals)     sodium bicarbonate 650 MG tablet        STOP taking these medications    spironolactone 50 MG tablet  Commonly known as: ALDACTONE            Code Status:  Full Code    Time Spent on discharge is  35 mins in patient examination, evaluation, counseling as well as medication reconciliation, prescriptions for required medications, discharge plan and follow up. Electronically signed by Domingo Dhillon MD on 4/7/2022 at 4:36 PM     Thank you Dr. Valery Lisa MD for the opportunity to be involved in this patient's care. This note was created with the assistance of a speech-recognition program.  Although the intention is to generate a document that actually reflects the content of the visit, no guarantees can be provided that every mistake has been identified and corrected by editing. Note was updated later by me after  physical examination and  completion of the assessment. (0) independent

## 2022-04-07 NOTE — WORK
[Other: ___] : [unfilled] [Was the competent medical cause of the injury] : was the competent medical cause of the injury [Are consistent with the injury] : are consistent with the injury [Consistent with my objective findings] : consistent with my objective findings [Partial] : partial [Does not reveal pre-existing condition(s) that may affect treatment/prognosis] : does not reveal pre-existing condition(s) that may affect treatment/prognosis [Can return to work without limitations on ______] : can return to work without limitations on [unfilled] [N/A] : : Not Applicable [No Rx restrictions] : No Rx restrictions. [I provided the services listed above] :  I provided the services listed above. [FreeTextEntry1] : good

## 2022-04-07 NOTE — PHYSICAL EXAM
[de-identified] : weell healed scar right elbow\par farom \par ttp at the end of the surgical area\par no other ttp\par farom right shoulder, wrist, hand \par nvid
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
## 2022-04-25 ENCOUNTER — APPOINTMENT (OUTPATIENT)
Dept: ORTHOPEDIC SURGERY | Facility: CLINIC | Age: 57
End: 2022-04-25

## 2022-05-10 ENCOUNTER — APPOINTMENT (OUTPATIENT)
Dept: ORTHOPEDIC SURGERY | Facility: CLINIC | Age: 57
End: 2022-05-10
Payer: OTHER MISCELLANEOUS

## 2022-05-10 VITALS — WEIGHT: 226 LBS | BODY MASS INDEX: 32.35 KG/M2 | HEIGHT: 70 IN

## 2022-05-10 PROCEDURE — J3490M: CUSTOM

## 2022-05-10 PROCEDURE — 99072 ADDL SUPL MATRL&STAF TM PHE: CPT

## 2022-05-10 PROCEDURE — 99214 OFFICE O/P EST MOD 30 MIN: CPT | Mod: 25

## 2022-05-10 PROCEDURE — 20611 DRAIN/INJ JOINT/BURSA W/US: CPT | Mod: RT

## 2022-05-10 NOTE — ASSESSMENT
[FreeTextEntry1] : Previous doc:\par 10/26/21: Right knee with moderate OA and large effusion. Pain has been persisting for 3 months. Pt concerned due to years of favoring left knee and his right knee has worsened. Possibly consequentially injured from years of pain on left knee has aggravated the right knee. Will try conservative therapy today including csi. Rtc in 4 weeks.\par 11/23/21: Short term relief from inj. MRI right knee eval for MMT as he only has mod OA. Will also get auth for\par orthovisc.\par 12/7/21: Left TKA 2018 doing very well. Right knee MRI showing adv OA medially with large MMT, some damage to ACL as well. He has medial and lateral symptoms and given his good outcome with left knee he wishes to proceed with right TKA. Declined option for scope and uni and I think TKA is his best option at this point. No medical issues.\par 1/4/22: Worsening pain and more fluid and swelling in the knee. Advanced OA - Been using OTC meds with no relief , HEP for the last few months - Today will asp/inj as the pain is significantly worse. I don't feel any formal PT will help him as he has done HEP from his previous surgery and his OA is advanced\par 2/1/22: Cont pain - TKA granted without prejudice but need full auth to proceed.\par 3/4/22: continued pain, awaiting for surgery, will contact  again, awaiting for authorization.\par \par 5/10/22: Asp/CSI R knee today. Awaiting deposition to determine auth for surgery.

## 2022-05-10 NOTE — PROCEDURE
[Large Joint Injection] : Large joint injection [Right] : of the right [Knee] : knee [X-ray evidence of Osteoarthritis on this or prior visit] : x-ray evidence of Osteoarthritis on this or prior visit [Alcohol] : alcohol [Betadine] : betadine [Ethyl Chloride sprayed topically] : ethyl chloride sprayed topically [Sterile technique used] : sterile technique used [___ cc    1%] : Lidocaine ~Vcc of 1%  [___ cc    0.25%] : Bupivacaine (Marcaine) ~Vcc of 0.25%  [___ cc    40mg] : Methylprednisolone (Depomedrol) ~Vcc of 40 mg  [Effusion] : effusion [] : Patient tolerated procedure well [Call if redness, pain or fever occur] : call if redness, pain or fever occur [Apply ice for 15min out of every hour for the next 12-24 hours as tolerated] : apply ice for 15 minutes out of every hour for the next 12-24 hours as tolerated [Patient was advised to rest the joint(s) for ____ days] : patient was advised to rest the joint(s) for [unfilled] days [Previous OTC use and PT nontherapeutic] : patient has tried OTC's including aspirin, Ibuprofen, Aleve, etc or prescription NSAIDS, and/or exercises at home and/or physical therapy without satisfactory response [Risks, benefits, alternatives discussed / Verbal consent obtained] : the risks benefits, and alternatives have been discussed, and verbal consent was obtained [Prior failure or difficult injection] : prior failure or difficult injection [All ultrasound images have been permanently captured and stored accordingly in our picture archiving and communication system] : All ultrasound images have been permanently captured and stored accordingly in our picture archiving and communication system [Visualization of the needle and placement of injection was performed without complication] : visualization of the needle and placement of injection was performed without complication [Pain] : pain [Inflammation] : inflammation [Effusion or other fluid collection] : effusion or other fluid collection [de-identified] : 5cc  [de-identified] : clear, yellow

## 2022-05-10 NOTE — IMAGING
- Present on admission - started at 11:30 last night, relieved with nitro paste application    - Troponin 0 08 on admission trending to 0 06    - EKG WNL   - Positive risk factors for age, sex, history of HLD, and family history  - Stress test diet ordered   - Trend troponin, EKG in AM, exercise stress test in AM  Consider cardiology evaluation if troponin rises or if stress test shows signs of ischemia [de-identified] : Right Knee: Inspection of the knee is as follows: large effusion and popliteal cyst noted. no ecchymosis and no erythema. \par Palpation of the knee is as follows: medial joint line tenderness and lateral joint line tenderness.\par Knee Range of Motion is as follows: Extension: 0 degrees. Flexion: 120 degrees. \par Ligament Stability and Special Test ligamentously stable. \par Neurological examination of the knee is as follows: light touch is intact throughout.

## 2022-05-10 NOTE — HISTORY OF PRESENT ILLNESS
[Work related] : work related [Dull/Aching] : dull/aching [Sharp] : sharp [Constant] : constant [Meds] : meds [Walking] : walking [Exercising] : exercising [de-identified] : 5/10/22: Here for f/up of his right knee. He spoke with the  2 weeks ago and was informed a deposition will be set up with Dr. Cartagena to determine auth for R TKA. Admits to some swelling and continued pain today\par  \par Previous doc:\par Mr. Donte Figueroa, a 56-year-old male, with history of L TKA in 2018, presents today for right knee pain for 2 months. Reports pain is localized to lateral, medial, and posterior portions of knee. Notes pain feels similar to pain prior to L TKA. Reports using Aleve with some improvement.\par 11/23/21: 2 weeks relief from inj then pain returned. Very swollen today.\par 12/7/21: F/U MRI right knee. Cont pain.\par 1/4/22: Continued pain in the right knee and loss of ROM\par 2/1/22: Cont pain, TKA was granted w/o prejudice.\par 3/4/22: continued pain, awaiting surgery.  [] : no [FreeTextEntry1] : right knee [FreeTextEntry3] : 10/03/16 [FreeTextEntry5] : pt reports pain is worse since last visit

## 2022-05-10 NOTE — DISCUSSION/SUMMARY
[de-identified] : The risks, benefits, contents and alternatives to injection were explained in full to the patient.  Risks outlined include but are not limited to infection, sepsis, bleeding, scarring, skin discoloration, temporary increase in pain, syncopal episode, failure to resolve symptoms, allergic reaction, flare reaction, permanent white skin discoloration, symptom recurrence, and elevation of blood sugar in diabetics.  Patient understood the risks.  All questions were answered.  After discussion of options, patient requested an injection.  Oral informed consent was obtained and sterile prep was done of the injection site.  Sterile technique was used to introduce the mixture.  Patient tolerated the procedure well.  Patient advised to ice the injection site this evening.  Signs and symptoms of infection reviewed and patient advised to call immediately for redness, fevers, and/or chills. \par Progress note completed by Kavitha Rodriguez PA-C.

## 2022-06-21 ENCOUNTER — APPOINTMENT (OUTPATIENT)
Dept: ORTHOPEDIC SURGERY | Facility: CLINIC | Age: 57
End: 2022-06-21

## 2022-06-29 ENCOUNTER — APPOINTMENT (OUTPATIENT)
Dept: SURGERY | Facility: CLINIC | Age: 57
End: 2022-06-29

## 2022-06-29 VITALS
DIASTOLIC BLOOD PRESSURE: 76 MMHG | TEMPERATURE: 97.6 F | HEIGHT: 70 IN | BODY MASS INDEX: 32.64 KG/M2 | OXYGEN SATURATION: 96 % | WEIGHT: 228 LBS | HEART RATE: 91 BPM | RESPIRATION RATE: 16 BRPM | SYSTOLIC BLOOD PRESSURE: 118 MMHG

## 2022-06-29 DIAGNOSIS — Z78.9 OTHER SPECIFIED HEALTH STATUS: ICD-10-CM

## 2022-06-29 DIAGNOSIS — K64.1 SECOND DEGREE HEMORRHOIDS: ICD-10-CM

## 2022-06-29 DIAGNOSIS — K62.5 HEMORRHAGE OF ANUS AND RECTUM: ICD-10-CM

## 2022-06-29 DIAGNOSIS — K64.4 RESIDUAL HEMORRHOIDAL SKIN TAGS: ICD-10-CM

## 2022-06-29 PROCEDURE — 99204 OFFICE O/P NEW MOD 45 MIN: CPT | Mod: 25

## 2022-06-29 PROCEDURE — 46221 LIGATION OF HEMORRHOID(S): CPT

## 2022-06-29 RX ORDER — ROSUVASTATIN CALCIUM 40 MG/1
40 TABLET, FILM COATED ORAL
Qty: 90 | Refills: 0 | Status: ACTIVE | COMMUNITY
Start: 2021-12-21

## 2022-06-29 RX ORDER — RAMIPRIL 10 MG/1
10 CAPSULE ORAL
Qty: 90 | Refills: 0 | Status: ACTIVE | COMMUNITY
Start: 2022-06-14

## 2022-06-29 NOTE — HISTORY OF PRESENT ILLNESS
[FreeTextEntry1] : Donte is a 56 year old male here for consultation. \par \par Colonoscopy 6/15/17 by Dr. Winston Huff- the examined portion of the ileum was normal. One 2 mm polyp in the cecum, resected. One 3 mm polyp in the transverse colon, resected. Internal hemorrhoids. Pathology; cecum polypectomy- fragments of tubular adenoma and unremarkable colonic mucosa. Transverse colon polypectomy- tubular adenoma. \par \par Today pt reports feeling well, no pain. 2 formed BMs daily, no pain, BRB dripping into toilet for 2 months, prior to 2 months intermittent BRB dripping into toilet. Last episode of rectal bleeding 6/23. External tissue that swells and shrinks for several years. No episodes of incontinence. Good appetite, no nausea, vomiting. Denies fever and chills. Uses Hydrocortisone with no relief. Not on anticoagulants.

## 2022-06-29 NOTE — CONSULT LETTER
[Dear  ___] : Dear ~JOSEFINA, [Courtesy Letter:] : I had the pleasure of seeing your patient, [unfilled], in my office today. [Please see my note below.] : Please see my note below. [Consult Closing:] : Thank you very much for allowing me to participate in the care of this patient.  If you have any questions, please do not hesitate to contact me. [Sincerely,] : Sincerely, [FreeTextEntry2] : Dr. Winston Huff [FreeTextEntry3] : Cj Perez M.D., JUAN.MARIMAR., F.RICHAR.S.TROYRSaloS.\[facility] Chief Colorectal Clinical Services, [facility] [DrSalo  ___] : Dr. SRINIVASAN

## 2022-06-29 NOTE — PHYSICAL EXAM
[Normal Breath Sounds] : Normal breath sounds [Normal Heart Sounds] : normal heart sounds [No Rash or Lesion] : No rash or lesion [Alert] : alert [Oriented to Person] : oriented to person [Oriented to Place] : oriented to place [Oriented to Time] : oriented to time [Calm] : calm [JVD] : no jugular venous distention  [de-identified] : WNL [de-identified] : WNL [de-identified] : ROM WNL [FreeTextEntry1] : Perianal inspection demonstrated a right posterior external tag.  A large right anterior hemorrhoid was noted on digital exam and confirmed on anoscopy.  Squamous metaplasia was identified consistent with intermittent prolapse.\par \par Anoscopy performed to evaluate internal hemorrhoids.  No sedation required

## 2022-06-29 NOTE — ASSESSMENT
[FreeTextEntry1] : I have seen and evaluated patient and I have corroborated all nursing input into this note.  Patient with grade 2 bleeding internal hemorrhoids.  The right posterior was the largest.  I recommended banding.  Indications, risks, benefits, alternatives reviewed including but not limited to bleeding, infection, recurrence, and failure.  All questions answered.  Patient agreed.  Right posterior hemorrhoid banded.  Post band instruction sheet reviewed.  Patient will return to my office in 1 month for further bands if bleeding continues.

## 2022-06-30 ENCOUNTER — APPOINTMENT (OUTPATIENT)
Dept: ORTHOPEDIC SURGERY | Facility: CLINIC | Age: 57
End: 2022-06-30

## 2022-08-09 ENCOUNTER — APPOINTMENT (OUTPATIENT)
Dept: ORTHOPEDIC SURGERY | Facility: CLINIC | Age: 57
End: 2022-08-09

## 2022-08-09 VITALS — HEIGHT: 70 IN | WEIGHT: 228 LBS | BODY MASS INDEX: 32.64 KG/M2

## 2022-08-09 PROCEDURE — 99213 OFFICE O/P EST LOW 20 MIN: CPT

## 2022-08-09 PROCEDURE — 99072 ADDL SUPL MATRL&STAF TM PHE: CPT

## 2022-08-09 NOTE — DISCUSSION/SUMMARY
[de-identified] : The risks, benefits, contents and alternatives to injection were explained in full to the patient.  Risks outlined include but are not limited to infection, sepsis, bleeding, scarring, skin discoloration, temporary increase in pain, syncopal episode, failure to resolve symptoms, allergic reaction, flare reaction, permanent white skin discoloration, symptom recurrence, and elevation of blood sugar in diabetics.  Patient understood the risks.  All questions were answered.  After discussion of options, patient requested an injection.  Oral informed consent was obtained and sterile prep was done of the injection site.  Sterile technique was used to introduce the mixture.  Patient tolerated the procedure well.  Patient advised to ice the injection site this evening.  Signs and symptoms of infection reviewed and patient advised to call immediately for redness, fevers, and/or chills. \par

## 2022-08-09 NOTE — IMAGING
[de-identified] : Right Knee: Inspection of the knee is as follows: large effusion and popliteal cyst noted. no ecchymosis and no erythema. \par Palpation of the knee is as follows: medial joint line tenderness and lateral joint line tenderness.\par Knee Range of Motion is as follows: Extension: 0 degrees. Flexion: 120 degrees. \par Ligament Stability and Special Test ligamentously stable. \par Neurological examination of the knee is as follows: light touch is intact throughout.

## 2022-08-09 NOTE — ASSESSMENT
[FreeTextEntry1] : Previous doc:\par 10/26/21: Right knee with moderate OA and large effusion. Pain has been persisting for 3 months. Pt concerned due to years of favoring left knee and his right knee has worsened. Possibly consequentially injured from years of pain on left knee has aggravated the right knee. Will try conservative therapy today including csi. Rtc in 4 weeks.\par 11/23/21: Short term relief from inj. MRI right knee eval for MMT as he only has mod OA. Will also get auth for\par orthovisc.\par 12/7/21: Left TKA 2018 doing very well. Right knee MRI showing adv OA medially with large MMT, some damage to ACL as well. He has medial and lateral symptoms and given his good outcome with left knee he wishes to proceed with right TKA. Declined option for scope and uni and I think TKA is his best option at this point. No medical issues.\par 1/4/22: Worsening pain and more fluid and swelling in the knee. Advanced OA - Been using OTC meds with no relief , HEP for the last few months - Today will asp/inj as the pain is significantly worse. I don't feel any formal PT will help him as he has done HEP from his previous surgery and his OA is advanced\par 2/1/22: Cont pain - TKA granted without prejudice but need full auth to proceed.\par 3/4/22: continued pain, awaiting for surgery, will contact  again, awaiting for authorization.\par 5/10/22: Asp/CSI R knee today. Awaiting deposition to determine auth for surgery. \par \par 8/9/22:continued and worsening pain in right knee - still not working waiting for auth for TKA

## 2022-08-09 NOTE — HISTORY OF PRESENT ILLNESS
[10] : 10 [6] : 6 [Dull/Aching] : dull/aching [de-identified] : 8/9/22: continued and worsening pain in right knee- Waiting for surg auth. Asp/inj helped for a few weeks but continued pain. \par  \par Previous doc:\par Mr. Donte Figueroa, a 56-year-old male, with history of L TKA in 2018, presents today for right knee pain for 2 months. Reports pain is localized to lateral, medial, and posterior portions of knee. Notes pain feels similar to pain prior to L TKA. Reports using Aleve with some improvement.\par 11/23/21: 2 weeks relief from inj then pain returned. Very swollen today.\par 12/7/21: F/U MRI right knee. Cont pain.\par 1/4/22: Continued pain in the right knee and loss of ROM\par 2/1/22: Cont pain, TKA was granted w/o prejudice.\par 3/4/22: continued pain, awaiting surgery. \par 5/10/22: Here for f/up of his right knee. He spoke with the  2 weeks ago and was informed a deposition will be set up with Dr. Cartagena to determine auth for R TKA. Admits to some swelling and continued pain today

## 2022-09-08 NOTE — DISCHARGE NOTE ADULT - FUNCTIONAL STATUS DATE
Clinic Care Coordination Contact  Community Health Worker Initial Outreach    CHW called patients daughter Amarilys and let her know that because [place] is in a TCU CCC RN will track her stay at TCU and CCC will contact patients daughter Amarilys after patient is discharged.     Madeline Medina  [facility] Health Worker  [facility] Care Coordination   Vishal Araujo [place], [facility]  Office: [phone]      01[birth year]2018

## 2022-09-25 ENCOUNTER — FORM ENCOUNTER (OUTPATIENT)
Age: 57
End: 2022-09-25

## 2022-10-24 ENCOUNTER — OUTPATIENT (OUTPATIENT)
Dept: OUTPATIENT SERVICES | Facility: HOSPITAL | Age: 57
LOS: 1 days | Discharge: HOME | End: 2022-10-24

## 2022-10-24 VITALS
WEIGHT: 229.5 LBS | HEIGHT: 70 IN | OXYGEN SATURATION: 96 % | SYSTOLIC BLOOD PRESSURE: 125 MMHG | DIASTOLIC BLOOD PRESSURE: 84 MMHG | RESPIRATION RATE: 17 BRPM | HEART RATE: 75 BPM | TEMPERATURE: 97 F

## 2022-10-24 DIAGNOSIS — Z98.890 OTHER SPECIFIED POSTPROCEDURAL STATES: Chronic | ICD-10-CM

## 2022-10-24 DIAGNOSIS — I10 ESSENTIAL (PRIMARY) HYPERTENSION: ICD-10-CM

## 2022-10-24 DIAGNOSIS — M17.11 UNILATERAL PRIMARY OSTEOARTHRITIS, RIGHT KNEE: ICD-10-CM

## 2022-10-24 DIAGNOSIS — Z01.818 ENCOUNTER FOR OTHER PREPROCEDURAL EXAMINATION: ICD-10-CM

## 2022-10-24 DIAGNOSIS — Z90.49 ACQUIRED ABSENCE OF OTHER SPECIFIED PARTS OF DIGESTIVE TRACT: Chronic | ICD-10-CM

## 2022-10-24 DIAGNOSIS — Z01.812 ENCOUNTER FOR PREPROCEDURAL LABORATORY EXAMINATION: ICD-10-CM

## 2022-10-24 DIAGNOSIS — E78.5 HYPERLIPIDEMIA, UNSPECIFIED: ICD-10-CM

## 2022-10-24 LAB
A1C WITH ESTIMATED AVERAGE GLUCOSE RESULT: 5.6 % — SIGNIFICANT CHANGE UP (ref 4–5.6)
ALBUMIN SERPL ELPH-MCNC: 3.6 G/DL — SIGNIFICANT CHANGE UP (ref 3.3–5)
ALP SERPL-CCNC: 62 U/L — SIGNIFICANT CHANGE UP (ref 40–120)
ALT FLD-CCNC: 28 U/L — SIGNIFICANT CHANGE UP (ref 12–78)
ANION GAP SERPL CALC-SCNC: 6 MMOL/L — SIGNIFICANT CHANGE UP (ref 5–17)
APTT BLD: 30.2 SEC — SIGNIFICANT CHANGE UP (ref 27.5–35.5)
AST SERPL-CCNC: 18 U/L — SIGNIFICANT CHANGE UP (ref 15–37)
BASOPHILS # BLD AUTO: 0.05 K/UL — SIGNIFICANT CHANGE UP (ref 0–0.2)
BASOPHILS NFR BLD AUTO: 0.7 % — SIGNIFICANT CHANGE UP (ref 0–2)
BILIRUB SERPL-MCNC: 0.5 MG/DL — SIGNIFICANT CHANGE UP (ref 0.2–1.2)
BUN SERPL-MCNC: 17 MG/DL — SIGNIFICANT CHANGE UP (ref 7–23)
CALCIUM SERPL-MCNC: 9.1 MG/DL — SIGNIFICANT CHANGE UP (ref 8.5–10.1)
CHLORIDE SERPL-SCNC: 106 MMOL/L — SIGNIFICANT CHANGE UP (ref 96–108)
CO2 SERPL-SCNC: 27 MMOL/L — SIGNIFICANT CHANGE UP (ref 22–31)
CREAT SERPL-MCNC: 0.84 MG/DL — SIGNIFICANT CHANGE UP (ref 0.5–1.3)
EGFR: 100 ML/MIN/1.73M2 — SIGNIFICANT CHANGE UP
EOSINOPHIL # BLD AUTO: 0.27 K/UL — SIGNIFICANT CHANGE UP (ref 0–0.5)
EOSINOPHIL NFR BLD AUTO: 3.7 % — SIGNIFICANT CHANGE UP (ref 0–6)
ESTIMATED AVERAGE GLUCOSE: 114 MG/DL — SIGNIFICANT CHANGE UP (ref 68–114)
GLUCOSE SERPL-MCNC: 89 MG/DL — SIGNIFICANT CHANGE UP (ref 70–99)
HCT VFR BLD CALC: 41.7 % — SIGNIFICANT CHANGE UP (ref 39–50)
HGB BLD-MCNC: 13.2 G/DL — SIGNIFICANT CHANGE UP (ref 13–17)
IMM GRANULOCYTES NFR BLD AUTO: 0.7 % — SIGNIFICANT CHANGE UP (ref 0–0.9)
INR BLD: 0.9 RATIO — SIGNIFICANT CHANGE UP (ref 0.88–1.16)
LYMPHOCYTES # BLD AUTO: 1.72 K/UL — SIGNIFICANT CHANGE UP (ref 1–3.3)
LYMPHOCYTES # BLD AUTO: 23.8 % — SIGNIFICANT CHANGE UP (ref 13–44)
MCHC RBC-ENTMCNC: 25.5 PG — LOW (ref 27–34)
MCHC RBC-ENTMCNC: 31.7 G/DL — LOW (ref 32–36)
MCV RBC AUTO: 80.7 FL — SIGNIFICANT CHANGE UP (ref 80–100)
MONOCYTES # BLD AUTO: 0.62 K/UL — SIGNIFICANT CHANGE UP (ref 0–0.9)
MONOCYTES NFR BLD AUTO: 8.6 % — SIGNIFICANT CHANGE UP (ref 2–14)
MRSA PCR RESULT.: SIGNIFICANT CHANGE UP
NEUTROPHILS # BLD AUTO: 4.52 K/UL — SIGNIFICANT CHANGE UP (ref 1.8–7.4)
NEUTROPHILS NFR BLD AUTO: 62.5 % — SIGNIFICANT CHANGE UP (ref 43–77)
NRBC # BLD: 0 /100 WBCS — SIGNIFICANT CHANGE UP (ref 0–0)
PLATELET # BLD AUTO: 253 K/UL — SIGNIFICANT CHANGE UP (ref 150–400)
POTASSIUM SERPL-MCNC: 4.2 MMOL/L — SIGNIFICANT CHANGE UP (ref 3.5–5.3)
POTASSIUM SERPL-SCNC: 4.2 MMOL/L — SIGNIFICANT CHANGE UP (ref 3.5–5.3)
PROT SERPL-MCNC: 7.4 GM/DL — SIGNIFICANT CHANGE UP (ref 6–8.3)
PROTHROM AB SERPL-ACNC: 10.7 SEC — SIGNIFICANT CHANGE UP (ref 10.5–13.4)
RBC # BLD: 5.17 M/UL — SIGNIFICANT CHANGE UP (ref 4.2–5.8)
RBC # FLD: 17.2 % — HIGH (ref 10.3–14.5)
S AUREUS DNA NOSE QL NAA+PROBE: DETECTED
SODIUM SERPL-SCNC: 139 MMOL/L — SIGNIFICANT CHANGE UP (ref 135–145)
VIT D25+D1,25 OH+D1,25 PNL SERPL-MCNC: 47.4 PG/ML — SIGNIFICANT CHANGE UP (ref 19.9–79.3)
WBC # BLD: 7.23 K/UL — SIGNIFICANT CHANGE UP (ref 3.8–10.5)
WBC # FLD AUTO: 7.23 K/UL — SIGNIFICANT CHANGE UP (ref 3.8–10.5)

## 2022-10-24 PROCEDURE — 93010 ELECTROCARDIOGRAM REPORT: CPT

## 2022-10-24 NOTE — OCCUPATIONAL THERAPY INITIAL EVALUATION ADULT - NSOTDISCHREC_GEN_A_CORE
Recommend home with rolling walker and OT referral to enable patient to safely perform ADL management and functional mobility.
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
## 2022-10-24 NOTE — H&P PST ADULT - NSICDXFAMILYHX_GEN_ALL_CORE_FT
FAMILY HISTORY:  Father  Still living? No  Family history of lung cancer, Age at diagnosis: Age Unknown    Mother  Still living? Yes, Estimated age: Age Unknown  Diabetes mellitus, Age at diagnosis: Age Unknown

## 2022-10-24 NOTE — OCCUPATIONAL THERAPY INITIAL EVALUATION ADULT - SOCIAL CONCERNS
Pt voiced concerns about his recovery at home. Pt endorsed that his spouse will be able to assist him after he is discharged home post-operatively./Complex psychosocial needs/coping issues

## 2022-10-24 NOTE — H&P PST ADULT - ASSESSMENT
right knee osteoarthritis   CAPRINI SCORE    AGE RELATED RISK FACTORS                                                       MOBILITY RELATED FACTORS  [x ] Age 41-60 years                                            (1 Point)                  [ ] Bed rest                                                        (1 Point)  [ ] Age: 61-74 years                                           (2 Points)                [ ] Plaster cast                                                   (2 Points)  [ ] Age= 75 years                                              (3 Points)                 [ ] Bed bound for more than 72 hours                   (2 Points)    DISEASE RELATED RISK FACTORS                                               GENDER SPECIFIC FACTORS  [ ] Edema in the lower extremities                       (1 Point)                  [ ] Pregnancy                                                     (1 Point)  [ ] Varicose veins                                               (1 Point)                  [ ] Post-partum < 6 weeks                                   (1 Point)             x[ ] BMI > 25 Kg/m2                                            (1 Point)                  [ ] Hormonal therapy  or oral contraception            (1 Point)                 [ ] Sepsis (in the previous month)                        (1 Point)                  [ ] History of pregnancy complications  [ ] Pneumonia or serious lung disease                                               [ ] Unexplained or recurrent                       (1 Point)           (in the previous month)                               (1 Point)  [ ] Abnormal pulmonary function test                     (1 Point)                 SURGERY RELATED RISK FACTORS  [ ] Acute myocardial infarction                              (1 Point)                 [ ]  Section                                            (1 Point)  [ ] Congestive heart failure (in the previous month)  (1 Point)                 [ ] Minor surgery                                                 (1 Point)   [ ] Inflammatory bowel disease                             (1 Point)                 [ ] Arthroscopic surgery                                        (2 Points)  [ ] Central venous access                                    (2 Points)                [ ] General surgery lasting more than 45 minutes   (2 Points)       [ ] Stroke (in the previous month)                          (5 Points)               x] Elective arthroplasty                                        (5 Points)                                                                                                                                               HEMATOLOGY RELATED FACTORS                                                 TRAUMA RELATED RISK FACTORS  [ ] Prior episodes of VTE                                     (3 Points)                 [ ] Fracture of the hip, pelvis, or leg                       (5 Points)  [ ] Positive family history for VTE                         (3 Points)                 [ ] Acute spinal cord injury (in the previous month)  (5 Points)  [ ] Prothrombin 54238 A                                      (3 Points)                 [ ] Paralysis  (less than 1 month)                          (5 Points)  [ ] Factor V Leiden                                             (3 Points)                 [ ] Multiple Trauma within 1 month                         (5 Points)  [ ] Lupus anticoagulants                                     (3 Points)                                                           [ ] Anticardiolipin antibodies                                (3 Points)                                                       [ ] High homocysteine in the blood                      (3 Points)                                             [ ] Other congenital or acquired thrombophilia       (3 Points)                                                [ ] Heparin induced thrombocytopenia                  (3 Points)                                          Total Score [      7    ]  Caprini Score 0-2: Low risk, No VTE Prophylaxis required for most patient's, encourage ambulation  Caprini Score 3-6: At Risk, Pharmacologic VTE prophylaxis is indicated for most patients ( in the absence of a contraindication)  Caprini Score Greater than or = 7: High Risk , pharmacologic VTE is indicated for most patients ( in the absence of a contraindication)    Caprini score indicates that the patient is high risk for VTE event ( score 6 or greater). Surgical patient's in this group will benefit from both pharmacologic prophylaxis and intermittent compression devices . Surgical team will determine the balance between VTE  risk and bleeding risk and other clinical considerations

## 2022-10-24 NOTE — PHYSICAL THERAPY INITIAL EVALUATION ADULT - ACTIVE RANGE OF MOTION EXAMINATION, REHAB EVAL
R knee limited due to pain/bilateral upper extremity Active ROM was WFL (within functional limits)/bilateral  lower extremity Active ROM was WFL (within functional limits)/deficits as listed below

## 2022-10-24 NOTE — OCCUPATIONAL THERAPY INITIAL EVALUATION ADULT - LIVES WITH, PROFILE
in a private house with 3 entry steps equipped with bilateral hand rails that cannot be reached simultaneously.  All living amenities are located on one level. The bathroom has a tub/shower combination, fixed shower head and standard toilet with adequate space to fit a commode over it./spouse
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
## 2022-10-24 NOTE — OCCUPATIONAL THERAPY INITIAL EVALUATION ADULT - GENERAL OBSERVATIONS, REHAB EVAL
Chart reviewed. Patient encountered seated in chair in rehab preop room in [facility]. Patient underwent occupational therapy pre-operative consultation to determine current functional ADL limitations in order to provide the right equipment for patient to perform functional ADL post operation.

## 2022-10-24 NOTE — PHYSICAL THERAPY INITIAL EVALUATION ADULT - ADDITIONAL COMMENTS
As per patient, he lives in a house with 3 steps to enter, +B wide rails. Once inside, patient is situated on the 1st floor. Patient has a tub/shower combo, no grab bars, standard height toilet with retractable shower head. Patient can fit a 3:1 commode, reports that he had one from his previous surgery that he did not use. Patient denies outpatient PT, no recent falls, and sometimes kwan in his R knee. Patient wears glasses for reading, is R hand dominant, currently drives and does not wear hearing aids.

## 2022-10-24 NOTE — H&P PST ADULT - NSICDXPASTMEDICALHX_GEN_ALL_CORE_FT
PAST MEDICAL HISTORY:  Obesity     Osteoarthritis      PAST MEDICAL HISTORY:  HLD (hyperlipidemia)     Mild HTN     Obesity     Osteoarthritis

## 2022-10-24 NOTE — H&P PST ADULT - HISTORY OF PRESENT ILLNESS
58 yo male , pmh htn c/o right knee pain secondary to osteoarthritis - scheduled for right  knee arthroplasty  goal : to walk without pain  and help out with 9 grandchildren  denies recent travels in the past 30 days. No fever, SOB, cough, flu like symptoms or body rash- covid screen  covid vaccine completed

## 2022-10-24 NOTE — PHYSICAL THERAPY INITIAL EVALUATION ADULT - SINGLE LEG BALANCE TEST, REHAB EVAL
One Leg Stand Test (OLST): Right: greater than 10 seconds Left: greater than 10 seconds  Functional test to assess fall risk. Both gait and stair negotiation require components of OLST. Participants unable to perform the OLST for at least 5 seconds are at increased risk for injurious fall.

## 2022-10-24 NOTE — OCCUPATIONAL THERAPY INITIAL EVALUATION ADULT - RANGE OF MOTION EXAMINATION, LOWER EXTREMITY
Pt's mobility and ADL management is limited due to pain in right knee ./Left LE Active ROM was WFL (within functional limits)/Left LE Passive ROM was WFL (w/i functional limits)/Right LE Passive ROM was WFL  (within functional limits)/Right LE Active Assistive ROM was WFL  (within functional limits)

## 2022-10-24 NOTE — OCCUPATIONAL THERAPY INITIAL EVALUATION ADULT - ADDITIONAL COMMENTS
At this time, pt is functioning in his roles, self sufficient, driving & ambulating independently in the community without any assistive devices. Pt owns no DME, except a 3:1 commode. Pt c/o 5/10 pain in his right knee at rest and 8/10 at worse. The pain is exacerbated, by walking, prolonged standing, negotiating steps and is relieved with  Aleve. Pt is right hand dominant and wears glasses for reading.

## 2022-10-25 RX ORDER — MUPIROCIN 20 MG/G
1 OINTMENT TOPICAL
Qty: 10 | Refills: 0
Start: 2022-10-25 | End: 2022-10-29

## 2022-11-04 NOTE — H&P PST ADULT - GASTROINTESTINAL DETAILS
Clear bilaterally, pupils equal, round and reactive to light. no masses palpable/no distention/bowel sounds normal/soft/nontender

## 2022-11-07 ENCOUNTER — LABORATORY RESULT (OUTPATIENT)
Age: 57
End: 2022-11-07

## 2022-11-08 ENCOUNTER — APPOINTMENT (OUTPATIENT)
Dept: ORTHOPEDIC SURGERY | Facility: CLINIC | Age: 57
End: 2022-11-08

## 2022-11-09 ENCOUNTER — FORM ENCOUNTER (OUTPATIENT)
Age: 57
End: 2022-11-09

## 2022-11-10 ENCOUNTER — TRANSCRIPTION ENCOUNTER (OUTPATIENT)
Age: 57
End: 2022-11-10

## 2022-11-10 ENCOUNTER — INPATIENT (INPATIENT)
Facility: HOSPITAL | Age: 57
LOS: 0 days | Discharge: HOME | End: 2022-11-11
Attending: ORTHOPAEDIC SURGERY | Admitting: ORTHOPAEDIC SURGERY

## 2022-11-10 ENCOUNTER — APPOINTMENT (OUTPATIENT)
Dept: ORTHOPEDIC SURGERY | Facility: HOSPITAL | Age: 57
End: 2022-11-10

## 2022-11-10 VITALS
WEIGHT: 223.11 LBS | TEMPERATURE: 98 F | RESPIRATION RATE: 16 BRPM | DIASTOLIC BLOOD PRESSURE: 67 MMHG | OXYGEN SATURATION: 96 % | SYSTOLIC BLOOD PRESSURE: 101 MMHG | HEART RATE: 72 BPM | HEIGHT: 70 IN

## 2022-11-10 DIAGNOSIS — Z98.890 OTHER SPECIFIED POSTPROCEDURAL STATES: Chronic | ICD-10-CM

## 2022-11-10 DIAGNOSIS — Z90.49 ACQUIRED ABSENCE OF OTHER SPECIFIED PARTS OF DIGESTIVE TRACT: Chronic | ICD-10-CM

## 2022-11-10 LAB
ALBUMIN SERPL ELPH-MCNC: 3.3 G/DL — SIGNIFICANT CHANGE UP (ref 3.3–5)
ALP SERPL-CCNC: 55 U/L — SIGNIFICANT CHANGE UP (ref 40–120)
ALT FLD-CCNC: 29 U/L — SIGNIFICANT CHANGE UP (ref 12–78)
ANION GAP SERPL CALC-SCNC: 8 MMOL/L — SIGNIFICANT CHANGE UP (ref 5–17)
ANION GAP SERPL CALC-SCNC: 9 MMOL/L — SIGNIFICANT CHANGE UP (ref 5–17)
AST SERPL-CCNC: 20 U/L — SIGNIFICANT CHANGE UP (ref 15–37)
BASOPHILS # BLD AUTO: 0.07 K/UL — SIGNIFICANT CHANGE UP (ref 0–0.2)
BASOPHILS NFR BLD AUTO: 0.5 % — SIGNIFICANT CHANGE UP (ref 0–2)
BILIRUB SERPL-MCNC: 0.3 MG/DL — SIGNIFICANT CHANGE UP (ref 0.2–1.2)
BUN SERPL-MCNC: 18 MG/DL — SIGNIFICANT CHANGE UP (ref 7–23)
BUN SERPL-MCNC: 18 MG/DL — SIGNIFICANT CHANGE UP (ref 7–23)
CALCIUM SERPL-MCNC: 8.6 MG/DL — SIGNIFICANT CHANGE UP (ref 8.5–10.1)
CALCIUM SERPL-MCNC: 8.7 MG/DL — SIGNIFICANT CHANGE UP (ref 8.5–10.1)
CHLORIDE SERPL-SCNC: 108 MMOL/L — SIGNIFICANT CHANGE UP (ref 96–108)
CHLORIDE SERPL-SCNC: 110 MMOL/L — HIGH (ref 96–108)
CK MB BLD-MCNC: 1.5 % — SIGNIFICANT CHANGE UP (ref 0–3.5)
CK MB CFR SERPL CALC: 1.7 NG/ML — SIGNIFICANT CHANGE UP (ref 0.5–3.6)
CK SERPL-CCNC: 112 U/L — SIGNIFICANT CHANGE UP (ref 26–308)
CO2 SERPL-SCNC: 22 MMOL/L — SIGNIFICANT CHANGE UP (ref 22–31)
CO2 SERPL-SCNC: 24 MMOL/L — SIGNIFICANT CHANGE UP (ref 22–31)
CREAT SERPL-MCNC: 0.91 MG/DL — SIGNIFICANT CHANGE UP (ref 0.5–1.3)
CREAT SERPL-MCNC: 0.97 MG/DL — SIGNIFICANT CHANGE UP (ref 0.5–1.3)
EGFR: 90 ML/MIN/1.73M2 — SIGNIFICANT CHANGE UP
EGFR: 97 ML/MIN/1.73M2 — SIGNIFICANT CHANGE UP
EOSINOPHIL # BLD AUTO: 0.15 K/UL — SIGNIFICANT CHANGE UP (ref 0–0.5)
EOSINOPHIL NFR BLD AUTO: 1.1 % — SIGNIFICANT CHANGE UP (ref 0–6)
GLUCOSE BLDC GLUCOMTR-MCNC: 118 MG/DL — HIGH (ref 70–99)
GLUCOSE SERPL-MCNC: 85 MG/DL — SIGNIFICANT CHANGE UP (ref 70–99)
GLUCOSE SERPL-MCNC: 86 MG/DL — SIGNIFICANT CHANGE UP (ref 70–99)
HCT VFR BLD CALC: 38.8 % — LOW (ref 39–50)
HCT VFR BLD CALC: 41.4 % — SIGNIFICANT CHANGE UP (ref 39–50)
HGB BLD-MCNC: 12.2 G/DL — LOW (ref 13–17)
HGB BLD-MCNC: 13.3 G/DL — SIGNIFICANT CHANGE UP (ref 13–17)
IMM GRANULOCYTES NFR BLD AUTO: 0.7 % — SIGNIFICANT CHANGE UP (ref 0–0.9)
LYMPHOCYTES # BLD AUTO: 17.8 % — SIGNIFICANT CHANGE UP (ref 13–44)
LYMPHOCYTES # BLD AUTO: 2.43 K/UL — SIGNIFICANT CHANGE UP (ref 1–3.3)
MCHC RBC-ENTMCNC: 26 PG — LOW (ref 27–34)
MCHC RBC-ENTMCNC: 26.3 PG — LOW (ref 27–34)
MCHC RBC-ENTMCNC: 31.4 G/DL — LOW (ref 32–36)
MCHC RBC-ENTMCNC: 32.1 G/DL — SIGNIFICANT CHANGE UP (ref 32–36)
MCV RBC AUTO: 82 FL — SIGNIFICANT CHANGE UP (ref 80–100)
MCV RBC AUTO: 82.6 FL — SIGNIFICANT CHANGE UP (ref 80–100)
MONOCYTES # BLD AUTO: 1.22 K/UL — HIGH (ref 0–0.9)
MONOCYTES NFR BLD AUTO: 8.9 % — SIGNIFICANT CHANGE UP (ref 2–14)
NEUTROPHILS # BLD AUTO: 9.72 K/UL — HIGH (ref 1.8–7.4)
NEUTROPHILS NFR BLD AUTO: 71 % — SIGNIFICANT CHANGE UP (ref 43–77)
NRBC # BLD: 0 /100 WBCS — SIGNIFICANT CHANGE UP (ref 0–0)
NRBC # BLD: 0 /100 WBCS — SIGNIFICANT CHANGE UP (ref 0–0)
PLATELET # BLD AUTO: 194 K/UL — SIGNIFICANT CHANGE UP (ref 150–400)
PLATELET # BLD AUTO: 225 K/UL — SIGNIFICANT CHANGE UP (ref 150–400)
POTASSIUM SERPL-MCNC: 3.6 MMOL/L — SIGNIFICANT CHANGE UP (ref 3.5–5.3)
POTASSIUM SERPL-MCNC: 4.4 MMOL/L — SIGNIFICANT CHANGE UP (ref 3.5–5.3)
POTASSIUM SERPL-SCNC: 3.6 MMOL/L — SIGNIFICANT CHANGE UP (ref 3.5–5.3)
POTASSIUM SERPL-SCNC: 4.4 MMOL/L — SIGNIFICANT CHANGE UP (ref 3.5–5.3)
PROT SERPL-MCNC: 6.7 GM/DL — SIGNIFICANT CHANGE UP (ref 6–8.3)
RBC # BLD: 4.7 M/UL — SIGNIFICANT CHANGE UP (ref 4.2–5.8)
RBC # BLD: 5.05 M/UL — SIGNIFICANT CHANGE UP (ref 4.2–5.8)
RBC # FLD: 16.4 % — HIGH (ref 10.3–14.5)
RBC # FLD: 16.5 % — HIGH (ref 10.3–14.5)
SODIUM SERPL-SCNC: 140 MMOL/L — SIGNIFICANT CHANGE UP (ref 135–145)
SODIUM SERPL-SCNC: 141 MMOL/L — SIGNIFICANT CHANGE UP (ref 135–145)
TROPONIN I, HIGH SENSITIVITY RESULT: 5 NG/L — SIGNIFICANT CHANGE UP
WBC # BLD: 13.68 K/UL — HIGH (ref 3.8–10.5)
WBC # BLD: 6.45 K/UL — SIGNIFICANT CHANGE UP (ref 3.8–10.5)
WBC # FLD AUTO: 13.68 K/UL — HIGH (ref 3.8–10.5)
WBC # FLD AUTO: 6.45 K/UL — SIGNIFICANT CHANGE UP (ref 3.8–10.5)

## 2022-11-10 PROCEDURE — 27447 TOTAL KNEE ARTHROPLASTY: CPT | Mod: RT

## 2022-11-10 PROCEDURE — 99291 CRITICAL CARE FIRST HOUR: CPT

## 2022-11-10 PROCEDURE — 73560 X-RAY EXAM OF KNEE 1 OR 2: CPT | Mod: 26,RT

## 2022-11-10 PROCEDURE — 93010 ELECTROCARDIOGRAM REPORT: CPT

## 2022-11-10 PROCEDURE — 27447 TOTAL KNEE ARTHROPLASTY: CPT | Mod: AS,RT

## 2022-11-10 PROCEDURE — 20985 CPTR-ASST DIR MS PX: CPT

## 2022-11-10 PROCEDURE — 20610 DRAIN/INJ JOINT/BURSA W/O US: CPT | Mod: 59,RT

## 2022-11-10 DEVICE — CEMENT SIMPLEX WITH TOBRAMYCIN: Type: IMPLANTABLE DEVICE | Site: RIGHT | Status: FUNCTIONAL

## 2022-11-10 DEVICE — IMPLANTABLE DEVICE: Type: IMPLANTABLE DEVICE | Site: RIGHT | Status: FUNCTIONAL

## 2022-11-10 DEVICE — ATTUNE MEDIAL PATELLA 38MM: Type: IMPLANTABLE DEVICE | Site: RIGHT | Status: FUNCTIONAL

## 2022-11-10 RX ORDER — PANTOPRAZOLE SODIUM 20 MG/1
40 TABLET, DELAYED RELEASE ORAL
Refills: 0 | Status: DISCONTINUED | OUTPATIENT
Start: 2022-11-10 | End: 2022-11-11

## 2022-11-10 RX ORDER — RAMIPRIL 5 MG
1 CAPSULE ORAL
Qty: 0 | Refills: 0 | DISCHARGE

## 2022-11-10 RX ORDER — MAGNESIUM HYDROXIDE 400 MG/1
30 TABLET, CHEWABLE ORAL DAILY
Refills: 0 | Status: DISCONTINUED | OUTPATIENT
Start: 2022-11-10 | End: 2022-11-11

## 2022-11-10 RX ORDER — CEFAZOLIN SODIUM 1 G
2000 VIAL (EA) INJECTION EVERY 8 HOURS
Refills: 0 | Status: COMPLETED | OUTPATIENT
Start: 2022-11-10 | End: 2022-11-11

## 2022-11-10 RX ORDER — SODIUM CHLORIDE 9 MG/ML
1000 INJECTION INTRAMUSCULAR; INTRAVENOUS; SUBCUTANEOUS
Refills: 0 | Status: DISCONTINUED | OUTPATIENT
Start: 2022-11-10 | End: 2022-11-11

## 2022-11-10 RX ORDER — SENNA PLUS 8.6 MG/1
2 TABLET ORAL AT BEDTIME
Refills: 0 | Status: DISCONTINUED | OUTPATIENT
Start: 2022-11-10 | End: 2022-11-11

## 2022-11-10 RX ORDER — KETOROLAC TROMETHAMINE 30 MG/ML
30 SYRINGE (ML) INJECTION EVERY 8 HOURS
Refills: 0 | Status: DISCONTINUED | OUTPATIENT
Start: 2022-11-10 | End: 2022-11-10

## 2022-11-10 RX ORDER — ONDANSETRON 8 MG/1
4 TABLET, FILM COATED ORAL ONCE
Refills: 0 | Status: DISCONTINUED | OUTPATIENT
Start: 2022-11-10 | End: 2022-11-10

## 2022-11-10 RX ORDER — INFLUENZA VIRUS VACCINE 15; 15; 15; 15 UG/.5ML; UG/.5ML; UG/.5ML; UG/.5ML
0.5 SUSPENSION INTRAMUSCULAR ONCE
Refills: 0 | Status: DISCONTINUED | OUTPATIENT
Start: 2022-11-10 | End: 2022-11-11

## 2022-11-10 RX ORDER — SODIUM CHLORIDE 9 MG/ML
1000 INJECTION, SOLUTION INTRAVENOUS
Refills: 0 | Status: DISCONTINUED | OUTPATIENT
Start: 2022-11-10 | End: 2022-11-10

## 2022-11-10 RX ORDER — CELECOXIB 200 MG/1
200 CAPSULE ORAL EVERY 12 HOURS
Refills: 0 | Status: DISCONTINUED | OUTPATIENT
Start: 2022-11-11 | End: 2022-11-11

## 2022-11-10 RX ORDER — OXYCODONE HYDROCHLORIDE 5 MG/1
10 TABLET ORAL EVERY 4 HOURS
Refills: 0 | Status: DISCONTINUED | OUTPATIENT
Start: 2022-11-10 | End: 2022-11-11

## 2022-11-10 RX ORDER — CELECOXIB 200 MG/1
200 CAPSULE ORAL ONCE
Refills: 0 | Status: COMPLETED | OUTPATIENT
Start: 2022-11-10 | End: 2022-11-10

## 2022-11-10 RX ORDER — OXYCODONE HYDROCHLORIDE 5 MG/1
5 TABLET ORAL EVERY 4 HOURS
Refills: 0 | Status: DISCONTINUED | OUTPATIENT
Start: 2022-11-10 | End: 2022-11-11

## 2022-11-10 RX ORDER — ROSUVASTATIN CALCIUM 5 MG/1
1 TABLET ORAL
Qty: 0 | Refills: 0 | DISCHARGE

## 2022-11-10 RX ORDER — ONDANSETRON 8 MG/1
4 TABLET, FILM COATED ORAL EVERY 6 HOURS
Refills: 0 | Status: DISCONTINUED | OUTPATIENT
Start: 2022-11-10 | End: 2022-11-11

## 2022-11-10 RX ORDER — HYDROMORPHONE HYDROCHLORIDE 2 MG/ML
0.5 INJECTION INTRAMUSCULAR; INTRAVENOUS; SUBCUTANEOUS ONCE
Refills: 0 | Status: DISCONTINUED | OUTPATIENT
Start: 2022-11-10 | End: 2022-11-11

## 2022-11-10 RX ORDER — DEXAMETHASONE 0.5 MG/5ML
10 ELIXIR ORAL ONCE
Refills: 0 | Status: COMPLETED | OUTPATIENT
Start: 2022-11-11 | End: 2022-11-11

## 2022-11-10 RX ORDER — LISINOPRIL 2.5 MG/1
10 TABLET ORAL DAILY
Refills: 0 | Status: DISCONTINUED | OUTPATIENT
Start: 2022-11-10 | End: 2022-11-11

## 2022-11-10 RX ORDER — LANOLIN ALCOHOL/MO/W.PET/CERES
3 CREAM (GRAM) TOPICAL AT BEDTIME
Refills: 0 | Status: DISCONTINUED | OUTPATIENT
Start: 2022-11-10 | End: 2022-11-11

## 2022-11-10 RX ORDER — POLYETHYLENE GLYCOL 3350 17 G/17G
17 POWDER, FOR SOLUTION ORAL AT BEDTIME
Refills: 0 | Status: DISCONTINUED | OUTPATIENT
Start: 2022-11-10 | End: 2022-11-11

## 2022-11-10 RX ORDER — SODIUM CHLORIDE 9 MG/ML
1000 INJECTION, SOLUTION INTRAVENOUS ONCE
Refills: 0 | Status: COMPLETED | OUTPATIENT
Start: 2022-11-10 | End: 2022-11-10

## 2022-11-10 RX ORDER — CHOLECALCIFEROL (VITAMIN D3) 125 MCG
1 CAPSULE ORAL
Qty: 0 | Refills: 0 | DISCHARGE

## 2022-11-10 RX ORDER — HYDROMORPHONE HYDROCHLORIDE 2 MG/ML
0.5 INJECTION INTRAMUSCULAR; INTRAVENOUS; SUBCUTANEOUS
Refills: 0 | Status: DISCONTINUED | OUTPATIENT
Start: 2022-11-10 | End: 2022-11-10

## 2022-11-10 RX ORDER — ACETAMINOPHEN 500 MG
650 TABLET ORAL ONCE
Refills: 0 | Status: COMPLETED | OUTPATIENT
Start: 2022-11-10 | End: 2022-11-10

## 2022-11-10 RX ORDER — KETOROLAC TROMETHAMINE 30 MG/ML
30 SYRINGE (ML) INJECTION EVERY 8 HOURS
Refills: 0 | Status: DISCONTINUED | OUTPATIENT
Start: 2022-11-10 | End: 2022-11-11

## 2022-11-10 RX ORDER — ATORVASTATIN CALCIUM 80 MG/1
80 TABLET, FILM COATED ORAL AT BEDTIME
Refills: 0 | Status: DISCONTINUED | OUTPATIENT
Start: 2022-11-10 | End: 2022-11-11

## 2022-11-10 RX ORDER — ACETAMINOPHEN 500 MG
975 TABLET ORAL EVERY 8 HOURS
Refills: 0 | Status: DISCONTINUED | OUTPATIENT
Start: 2022-11-10 | End: 2022-11-11

## 2022-11-10 RX ORDER — ASPIRIN/CALCIUM CARB/MAGNESIUM 324 MG
81 TABLET ORAL
Refills: 0 | Status: DISCONTINUED | OUTPATIENT
Start: 2022-11-11 | End: 2022-11-11

## 2022-11-10 RX ORDER — SODIUM CHLORIDE 9 MG/ML
3 INJECTION INTRAMUSCULAR; INTRAVENOUS; SUBCUTANEOUS EVERY 8 HOURS
Refills: 0 | Status: DISCONTINUED | OUTPATIENT
Start: 2022-11-10 | End: 2022-11-10

## 2022-11-10 RX ORDER — ACETAMINOPHEN 500 MG
1000 TABLET ORAL ONCE
Refills: 0 | Status: DISCONTINUED | OUTPATIENT
Start: 2022-11-10 | End: 2022-11-11

## 2022-11-10 RX ADMIN — Medication 650 MILLIGRAM(S): at 08:30

## 2022-11-10 RX ADMIN — CELECOXIB 200 MILLIGRAM(S): 200 CAPSULE ORAL at 07:59

## 2022-11-10 RX ADMIN — Medication 30 MILLIGRAM(S): at 16:00

## 2022-11-10 RX ADMIN — ATORVASTATIN CALCIUM 80 MILLIGRAM(S): 80 TABLET, FILM COATED ORAL at 21:41

## 2022-11-10 RX ADMIN — Medication 975 MILLIGRAM(S): at 15:00

## 2022-11-10 RX ADMIN — OXYCODONE HYDROCHLORIDE 5 MILLIGRAM(S): 5 TABLET ORAL at 21:40

## 2022-11-10 RX ADMIN — OXYCODONE HYDROCHLORIDE 5 MILLIGRAM(S): 5 TABLET ORAL at 22:40

## 2022-11-10 RX ADMIN — Medication 650 MILLIGRAM(S): at 07:59

## 2022-11-10 RX ADMIN — Medication 30 MILLIGRAM(S): at 22:40

## 2022-11-10 RX ADMIN — Medication 30 MILLIGRAM(S): at 15:20

## 2022-11-10 RX ADMIN — SENNA PLUS 2 TABLET(S): 8.6 TABLET ORAL at 21:41

## 2022-11-10 RX ADMIN — OXYCODONE HYDROCHLORIDE 5 MILLIGRAM(S): 5 TABLET ORAL at 15:00

## 2022-11-10 RX ADMIN — SODIUM CHLORIDE 75 MILLILITER(S): 9 INJECTION, SOLUTION INTRAVENOUS at 12:13

## 2022-11-10 RX ADMIN — Medication 30 MILLIGRAM(S): at 21:42

## 2022-11-10 RX ADMIN — Medication 975 MILLIGRAM(S): at 22:40

## 2022-11-10 RX ADMIN — POLYETHYLENE GLYCOL 3350 17 GRAM(S): 17 POWDER, FOR SOLUTION ORAL at 21:41

## 2022-11-10 RX ADMIN — OXYCODONE HYDROCHLORIDE 10 MILLIGRAM(S): 5 TABLET ORAL at 16:14

## 2022-11-10 RX ADMIN — OXYCODONE HYDROCHLORIDE 5 MILLIGRAM(S): 5 TABLET ORAL at 19:30

## 2022-11-10 RX ADMIN — CELECOXIB 200 MILLIGRAM(S): 200 CAPSULE ORAL at 08:30

## 2022-11-10 RX ADMIN — Medication 975 MILLIGRAM(S): at 14:00

## 2022-11-10 RX ADMIN — OXYCODONE HYDROCHLORIDE 10 MILLIGRAM(S): 5 TABLET ORAL at 17:00

## 2022-11-10 RX ADMIN — OXYCODONE HYDROCHLORIDE 5 MILLIGRAM(S): 5 TABLET ORAL at 18:40

## 2022-11-10 RX ADMIN — Medication 100 MILLIGRAM(S): at 16:42

## 2022-11-10 RX ADMIN — SODIUM CHLORIDE 125 MILLILITER(S): 9 INJECTION INTRAMUSCULAR; INTRAVENOUS; SUBCUTANEOUS at 13:59

## 2022-11-10 RX ADMIN — Medication 975 MILLIGRAM(S): at 21:41

## 2022-11-10 RX ADMIN — OXYCODONE HYDROCHLORIDE 5 MILLIGRAM(S): 5 TABLET ORAL at 14:00

## 2022-11-10 RX ADMIN — SODIUM CHLORIDE 1000 MILLILITER(S): 9 INJECTION, SOLUTION INTRAVENOUS at 15:48

## 2022-11-10 NOTE — DISCHARGE NOTE PROVIDER - HOSPITAL COURSE
57yMale with history of OA presenting for R TKA by Dr. Cartagena on 11/10/2022. Risk and benefits of surgery were explained to the patient. The patient understood and agreed to proceed with surgery. Patient underwent the procedure with no intraoperative complications. Pt was brought in stable condition to the PACU. Once stable in PACU, pt was brought to the floor. During hospital stay pt was followed by Medicine, physical therapy, Home Care during this admission. Pt had an uneventful hospital course. Pt is stable for discharge to home 57yMale with history of OA presenting for R TKA by Dr. Cartagena on 11/10/2022. Risk and benefits of surgery were explained to the patient. The patient understood and agreed to proceed with surgery. Patient underwent the procedure with no intraoperative complications. Pt was brought in stable condition to the PACU. Once stable in PACU, pt was brought to the floor. During hospital stay pt was followed by Medicine, physical therapy, Home Care during this admission. Pt had a rapid response activation on POD#0 for vasovagal episode. He had an EKG done which showed NSR and cardiac enzymes were drawn which were negative for pathology. After that he had an uneventful hospital course. Pt is stable for discharge to home on pOD#1.

## 2022-11-10 NOTE — PHYSICAL THERAPY INITIAL EVALUATION ADULT - ACTIVE RANGE OF MOTION EXAMINATION, REHAB EVAL
except R knee flexion grossly 70 degrees, extension -5 degrees/bilateral upper extremity Active ROM was WFL (within functional limits)/bilateral  lower extremity Active ROM was WFL (within functional limits)

## 2022-11-10 NOTE — DISCHARGE NOTE PROVIDER - NSDCFUSCHEDAPPT_GEN_ALL_CORE_FT
Pacheco Cartagena  [facility] Physician [facility]  [address]  Scheduled Appointment: 11/22/2022

## 2022-11-10 NOTE — PHYSICAL THERAPY INITIAL EVALUATION ADULT - GENERAL OBSERVATIONS, REHAB EVAL
Pt found semi supine in bed in NAD, +hep lock, +SCDs, +ace wrap, agreeable to PT Con and RN Arti aware.

## 2022-11-10 NOTE — PATIENT PROFILE ADULT - FALL HARM RISK - HARM RISK INTERVENTIONS

## 2022-11-10 NOTE — DISCHARGE NOTE PROVIDER - NSDCMRMEDTOKEN_GEN_ALL_CORE_FT
acetaminophen 325 mg oral tablet: 2 tab(s) orally every 6 hours, As needed, For Temp over 38.3 C (100.94 F)  ascorbic acid 500 mg oral tablet: 1 tab(s) orally 2 times a day  aspirin 325 mg oral delayed release tablet: 1 tab(s) orally 2 times a day MDD:2 Tabs  CoQ10: 1 cap(s) orally once a day  docusate sodium 100 mg oral capsule: 1 cap(s) orally 3 times a day  magnesium hydroxide 8% oral suspension: 30 milliliter(s) orally once a day, As needed, Constipation  Marino Red: 1 tab(s) orally once a day  Multiple Vitamins oral tablet: 1 tab(s) orally once a day  mupirocin 2% topical ointment: Apply topically to affected area 2 times a day MDD:2 intranasal   oxyCODONE 10 mg oral tablet: 1 tab(s) orally every 4 hours, As needed, Moderate Pain MDD:6 Tabs  ramipril 10 mg oral capsule: 1 cap(s) orally once a day  rosuvastatin 40 mg oral tablet: 1 tab(s) orally once a day   acetaminophen 325 mg oral tablet: 3 tab(s) orally every 8 hours  ascorbic acid 500 mg oral tablet: 1 tab(s) orally 2 times a day  Aspirin Enteric Coated 81 mg oral delayed release tablet: 1 tab(s) orally 2 times a day MDD:2  celecoxib 200 mg oral capsule: 1 cap(s) orally every 12 hours MDD:2  magnesium hydroxide 8% oral suspension: 30 milliliter(s) orally once a day, As needed, Constipation  Multiple Vitamins oral tablet: 1 tab(s) orally once a day  Narcan 4 mg/0.1 mL nasal spray: 4 milligram(s) intranasally once, repeat as necessary.   As needed. For suspected opiate overdose   Follow instructions on packet MDD:0.2 ml  oxyCODONE 5 mg oral tablet: 1-2 tab(s) orally every 4 hours, As Needed -Pain MDD:6  pantoprazole 40 mg oral delayed release tablet: 1 tab(s) orally once a day (before a meal) MDD:1  polyethylene glycol 3350 oral powder for reconstitution: 17 gram(s) orally once a day (at bedtime)  ramipril 10 mg oral capsule: 1 cap(s) orally once a day  rosuvastatin 40 mg oral tablet: 1 tab(s) orally once a day  senna leaf extract oral tablet: 2 tab(s) orally once a day (at bedtime)

## 2022-11-10 NOTE — DISCHARGE NOTE PROVIDER - CARE PROVIDER_API CALL
Pacheco Cartagena)  Orthopaedic Surgery  [address] [place] [address]  Phone: [phone]  Fax: [phone]  Follow Up Time:

## 2022-11-10 NOTE — DISCHARGE NOTE PROVIDER - NSDCFUADDAPPT_GEN_ALL_CORE_FT
Follow up with your surgeon in two weeks. Call for appointment.    If you need more pain medications, call your surgeon's office. For medication refills or authorizations call [phone]    Call and schedule a follow up appointment with your primary care physician for repeat blood work (CBC and BMP) for post hospital discharge follow-up care.    Call your surgeon if you have increased redness/pain/drainage or fever. Return to ER for shortness of breath/calf tenderness.

## 2022-11-10 NOTE — DISCHARGE NOTE PROVIDER - NSDCFUADDINST_GEN_ALL_CORE_FT
Keep Prineo Dressing Clean, Dry and Intact. May shower with Prineo Dressing. Please do not scrub, soak, peel or pick at the prineo dressing. No creams, lotions, or oils over dressing. May shower and let water run over incision, no baths. Pat dry once out of shower. Dressing to be removed in office at follow up visit in 2 weeks. There are no staples or stitches that need to be removed.  If you notice any redness, irritation, or itching around the prineo dressing call the surgeon's office    Per Dr. Cartagena: may advance from walker as tolerated per discretion of physical therapist.     Keep knee straight while at rest. Elevate the leg as much as possible ("toes above the nose") to help control swelling. Make sure you get up and take a brief walk every two hours to help with circulation and prevent stiffness. Incentive spirometer 10X/hour. Cryocuff to help with pain/inflammation.

## 2022-11-10 NOTE — PHYSICAL THERAPY INITIAL EVALUATION ADULT - STRENGTHENING, PT EVAL
Patient will improve R knee strength by 1 grade to improve overall functional mobility including gait, transfers, bed mobility and decrease risk of falls.

## 2022-11-10 NOTE — CONSULT NOTE ADULT - SUBJECTIVE AND OBJECTIVE BOX
ED LEWIS is a 57y Male s/p RIGHT TOTAL KNEE ARTHROPLASTY      w/ h/o Obesity    Osteoarthritis    Mild HTN    HLD (hyperlipidemia)      denies any chest pain shortness of breath palpitation dizziness lightheadedness nausea vomiting fever or chills    H/O arthroscopy of left knee    History of thumb surgery    H/O cervical spine surgery    H/O bilateral inguinal hernia repair    S/P cholecystectomy    H/O exploratory laparotomy    H/O shoulder surgery      Diabetes mellitus (Mother)    Family history of lung cancer (Father)      SH: doesnot smoke or drink at this time    No Known Allergies    acetaminophen     Tablet .. 975 milliGRAM(s) Oral every 8 hours  acetaminophen   IVPB .. 1000 milliGRAM(s) IV Intermittent once  atorvastatin 80 milliGRAM(s) Oral at bedtime  ceFAZolin   IVPB 2000 milliGRAM(s) IV Intermittent every 8 hours  HYDROmorphone  Injectable 0.5 milliGRAM(s) IV Push once  influenza   Vaccine 0.5 milliLiter(s) IntraMuscular once  ketorolac   Injectable 30 milliGRAM(s) IV Push every 8 hours  lisinopril 10 milliGRAM(s) Oral daily  magnesium hydroxide Suspension 30 milliLiter(s) Oral daily PRN  melatonin 3 milliGRAM(s) Oral at bedtime PRN  multivitamin 1 Tablet(s) Oral daily  ondansetron Injectable 4 milliGRAM(s) IV Push every 6 hours PRN  oxyCODONE    IR 5 milliGRAM(s) Oral every 4 hours  oxyCODONE    IR 5 milliGRAM(s) Oral every 4 hours PRN  oxyCODONE    IR 10 milliGRAM(s) Oral every 4 hours PRN  pantoprazole    Tablet 40 milliGRAM(s) Oral before breakfast  polyethylene glycol 3350 17 Gram(s) Oral at bedtime  senna 2 Tablet(s) Oral at bedtime  sodium chloride 0.9%. 1000 milliLiter(s) IV Continuous <Continuous>    T(C): 36.6 (11-10-22 @ 20:00), Max: 36.9 (11-10-22 @ 07:46)  HR: 78 (11-10-22 @ 20:00) (54 - 83)  BP: 122/76 (11-10-22 @ 20:00) (101/67 - 136/86)  RR: 16 (11-10-22 @ 20:00) (10 - 18)  SpO2: 96% (11-10-22 @ 20:00) (95% - 100%)  HEENT unremarkable  neck no JVD or bruit  heart normal S1 S2 RRR no gallops or rubs  chest clear to auscultation  abd sof nontender non distended +bs  ext no calf tenderness    A/P   DVT PX  pain control  bowel regimen   wound care as per ortho  GI PX  antiemetics prn  incentive spirometer

## 2022-11-10 NOTE — PHYSICAL THERAPY INITIAL EVALUATION ADULT - ADDITIONAL COMMENTS
Preop info confirmed. As per patient, he lives in a house with 3 steps to enter, +B wide rails. Once inside, patient is situated on the 1st floor. Patient has a tub/shower combo, no grab bars, standard height toilet with retractable shower head. Patient can fit a 3:1 commode, reports that he had one from his previous surgery that he did not use. Patient denies outpatient PT, no recent falls, and sometimes kwan in his R knee. Patient wears glasses for reading, is R hand dominant, currently drives and does not wear hearing aids.

## 2022-11-10 NOTE — PHYSICAL THERAPY INITIAL EVALUATION ADULT - IMPAIRMENTS FOUND, PT EVAL
aerobic capacity/endurance/decreased midline orientation/ergonomics and body mechanics/gait, locomotion, and balance/joint integrity and mobility/muscle strength/ROM

## 2022-11-10 NOTE — RAPID RESPONSE TEAM SUMMARY - NSSITUATIONBACKGROUNDRRT_GEN_ALL_CORE
RRT called for hypotension and chest pain.    This is a 57y yo male with PMHx of HTN, HLD and OA who is s/p Right TKA earlier today. Patient was working with physical therapy on stairs when he began to have sharp chest pain, nausea, and dizziness. The patient was brought back to his room and his blood pressure was found to be 64/58. Upon arrival to RRT patient in bed in trandelenburg position, A/Ox4, right knee dressing intact. 1L bolus initiated. BP improved to 102/65, HR 60. Patient states that chest pain resolved. EKG sinus rhythm. Repetat VS as follows: /77, HR 65, RR 21, T 97.5, SpO2 96%, blood [id number]. The patient's only complaint is of pain to his right knee. Toradol given.     A/P  57y yo male with PMHx of HTN, HLD and OA who is s/p Right TKA now with vasovagal vs. orthostatic hypotension during physical therapy. Hypotension improved and patient symptoms have resolved.    -1L bolus  -EKG- NSR  -Labs: CBC, BMP, troponin  -Toradol PRN pain  *Case/ Plan discussed with Dr. Jacques and ortho PA at bedside

## 2022-11-11 ENCOUNTER — TRANSCRIPTION ENCOUNTER (OUTPATIENT)
Age: 57
End: 2022-11-11

## 2022-11-11 VITALS
TEMPERATURE: 98 F | HEART RATE: 75 BPM | DIASTOLIC BLOOD PRESSURE: 89 MMHG | OXYGEN SATURATION: 98 % | SYSTOLIC BLOOD PRESSURE: 158 MMHG | RESPIRATION RATE: 17 BRPM

## 2022-11-11 LAB
ANION GAP SERPL CALC-SCNC: 4 MMOL/L — LOW (ref 5–17)
BUN SERPL-MCNC: 15 MG/DL — SIGNIFICANT CHANGE UP (ref 7–23)
CALCIUM SERPL-MCNC: 8.1 MG/DL — LOW (ref 8.5–10.1)
CHLORIDE SERPL-SCNC: 108 MMOL/L — SIGNIFICANT CHANGE UP (ref 96–108)
CO2 SERPL-SCNC: 27 MMOL/L — SIGNIFICANT CHANGE UP (ref 22–31)
CREAT SERPL-MCNC: 0.7 MG/DL — SIGNIFICANT CHANGE UP (ref 0.5–1.3)
EGFR: 106 ML/MIN/1.73M2 — SIGNIFICANT CHANGE UP
GLUCOSE SERPL-MCNC: 117 MG/DL — HIGH (ref 70–99)
HCT VFR BLD CALC: 34.2 % — LOW (ref 39–50)
HGB BLD-MCNC: 11.1 G/DL — LOW (ref 13–17)
MCHC RBC-ENTMCNC: 26 PG — LOW (ref 27–34)
MCHC RBC-ENTMCNC: 32.5 G/DL — SIGNIFICANT CHANGE UP (ref 32–36)
MCV RBC AUTO: 80.1 FL — SIGNIFICANT CHANGE UP (ref 80–100)
NRBC # BLD: 0 /100 WBCS — SIGNIFICANT CHANGE UP (ref 0–0)
PLATELET # BLD AUTO: 179 K/UL — SIGNIFICANT CHANGE UP (ref 150–400)
POTASSIUM SERPL-MCNC: 4.4 MMOL/L — SIGNIFICANT CHANGE UP (ref 3.5–5.3)
POTASSIUM SERPL-SCNC: 4.4 MMOL/L — SIGNIFICANT CHANGE UP (ref 3.5–5.3)
RBC # BLD: 4.27 M/UL — SIGNIFICANT CHANGE UP (ref 4.2–5.8)
RBC # FLD: 16.6 % — HIGH (ref 10.3–14.5)
SODIUM SERPL-SCNC: 139 MMOL/L — SIGNIFICANT CHANGE UP (ref 135–145)
WBC # BLD: 8.11 K/UL — SIGNIFICANT CHANGE UP (ref 3.8–10.5)
WBC # FLD AUTO: 8.11 K/UL — SIGNIFICANT CHANGE UP (ref 3.8–10.5)

## 2022-11-11 RX ORDER — SENNA PLUS 8.6 MG/1
2 TABLET ORAL
Qty: 0 | Refills: 0 | DISCHARGE
Start: 2022-11-11

## 2022-11-11 RX ORDER — PANTOPRAZOLE SODIUM 20 MG/1
1 TABLET, DELAYED RELEASE ORAL
Qty: 30 | Refills: 0
Start: 2022-11-11 | End: 2022-12-10

## 2022-11-11 RX ORDER — ACETAMINOPHEN 500 MG
3 TABLET ORAL
Qty: 0 | Refills: 0 | DISCHARGE
Start: 2022-11-11

## 2022-11-11 RX ORDER — UBIDECARENONE 100 MG
1 CAPSULE ORAL
Qty: 0 | Refills: 0 | DISCHARGE

## 2022-11-11 RX ORDER — ASPIRIN/CALCIUM CARB/MAGNESIUM 324 MG
1 TABLET ORAL
Qty: 60 | Refills: 0
Start: 2022-11-11 | End: 2022-12-10

## 2022-11-11 RX ORDER — POLYETHYLENE GLYCOL 3350 17 G/17G
17 POWDER, FOR SOLUTION ORAL
Qty: 0 | Refills: 0 | DISCHARGE
Start: 2022-11-11

## 2022-11-11 RX ORDER — OXYCODONE HYDROCHLORIDE 5 MG/1
1 TABLET ORAL
Qty: 42 | Refills: 0
Start: 2022-11-11 | End: 2022-11-17

## 2022-11-11 RX ORDER — NALOXONE HYDROCHLORIDE 4 MG/.1ML
4 SPRAY NASAL
Qty: 1 | Refills: 0
Start: 2022-11-11 | End: 2022-11-11

## 2022-11-11 RX ORDER — CELECOXIB 200 MG/1
1 CAPSULE ORAL
Qty: 60 | Refills: 0
Start: 2022-11-11 | End: 2022-12-10

## 2022-11-11 RX ADMIN — Medication 1 TABLET(S): at 11:37

## 2022-11-11 RX ADMIN — Medication 102 MILLIGRAM(S): at 05:46

## 2022-11-11 RX ADMIN — OXYCODONE HYDROCHLORIDE 5 MILLIGRAM(S): 5 TABLET ORAL at 09:47

## 2022-11-11 RX ADMIN — OXYCODONE HYDROCHLORIDE 5 MILLIGRAM(S): 5 TABLET ORAL at 02:58

## 2022-11-11 RX ADMIN — Medication 30 MILLIGRAM(S): at 05:46

## 2022-11-11 RX ADMIN — CELECOXIB 200 MILLIGRAM(S): 200 CAPSULE ORAL at 06:45

## 2022-11-11 RX ADMIN — OXYCODONE HYDROCHLORIDE 5 MILLIGRAM(S): 5 TABLET ORAL at 01:58

## 2022-11-11 RX ADMIN — Medication 975 MILLIGRAM(S): at 06:45

## 2022-11-11 RX ADMIN — CELECOXIB 200 MILLIGRAM(S): 200 CAPSULE ORAL at 05:44

## 2022-11-11 RX ADMIN — OXYCODONE HYDROCHLORIDE 5 MILLIGRAM(S): 5 TABLET ORAL at 10:47

## 2022-11-11 RX ADMIN — OXYCODONE HYDROCHLORIDE 5 MILLIGRAM(S): 5 TABLET ORAL at 06:45

## 2022-11-11 RX ADMIN — Medication 81 MILLIGRAM(S): at 05:44

## 2022-11-11 RX ADMIN — Medication 100 MILLIGRAM(S): at 01:58

## 2022-11-11 RX ADMIN — LISINOPRIL 10 MILLIGRAM(S): 2.5 TABLET ORAL at 05:43

## 2022-11-11 RX ADMIN — Medication 975 MILLIGRAM(S): at 05:45

## 2022-11-11 RX ADMIN — PANTOPRAZOLE SODIUM 40 MILLIGRAM(S): 20 TABLET, DELAYED RELEASE ORAL at 05:44

## 2022-11-11 RX ADMIN — OXYCODONE HYDROCHLORIDE 5 MILLIGRAM(S): 5 TABLET ORAL at 05:45

## 2022-11-11 RX ADMIN — Medication 30 MILLIGRAM(S): at 06:45

## 2022-11-11 NOTE — PROGRESS NOTE ADULT - SUBJECTIVE AND OBJECTIVE BOX
ED LEWIS is a 57y Male s/p RIGHT TOTAL KNEE ARTHROPLASTY        denies any chest pain shortness of breath palpitation dizziness lightheadedness nausea vomiting fever or chills    T(C): 36.6 (11-11-22 @ 08:10), Max: 36.9 (11-10-22 @ 15:00)  HR: 78 (11-11-22 @ 08:10) (54 - 83)  BP: 129/85 (11-11-22 @ 08:10) (102/65 - 136/86)  RR: 18 (11-11-22 @ 08:10) (10 - 18)  SpO2: 96% (11-11-22 @ 08:10) (95% - 100%)  no jvd/bruit  s1 s2 rrr  cta  s/nt/nd  no calf tend                        11.1   8.11  )-----------( 179      ( 11 Nov 2022 06:25 )             34.2   11-11    139  |  108  |  15  ----------------------------<  117<H>  4.4   |  27  |  0.70    Ca    8.1<L>      11 Nov 2022 06:25    TPro  6.7  /  Alb  3.3  /  TBili  0.3  /  DBili  x   /  AST  20  /  ALT  29  /  AlkPhos  55  11-10      cont dvt px  pain control  bowel regimen  antiemetics  incentive spirometer
Patient is seen and examined at bedside.  Pt is s/p RRT activation for vasovagal. Has pain right knee but CP and diaphoresis has since resolved.    Vital Signs Last 24 Hrs  T(C): 36.9 (10 Nov 2022 15:00), Max: 36.9 (10 Nov 2022 07:46)  T(F): 98.4 (10 Nov 2022 15:00), Max: 98.4 (10 Nov 2022 07:46)  HR: 61 (10 Nov 2022 15:30) (54 - 83)  BP: 102/65 (10 Nov 2022 15:30) (101/67 - 136/86)  BP(mean): --  RR: 16 (10 Nov 2022 15:00) (10 - 18)  SpO2: 96% (10 Nov 2022 15:30) (95% - 100%)    Parameters below as of 10 Nov 2022 15:30  Patient On (Oxygen Delivery Method): room air          PHYSICAL EXAM:  General: NAD, WDWN.   Neuro:  Alert & responsive  HEENT: NCAT, EOMI, conjunctiva clear  abd: soft, NT/ND   Right LE: ACE bandage C/D/I. Motor intact + EHL/FHL/TA/GS.  Sensation is grossly intact.  Extremity warm, compartments soft, compressible. No calf tenderness. DP 2+   Left LE: Motor intact +EHL/FHL/TA/GS. Sensation is grossly intact. Extremity warm, compartments soft, compressible. No calf tenderness. DP2+    Labs:                          12.2   6.45  )-----------( 194      ( 10 Nov 2022 11:48 )             38.8       11-10    141  |  110<H>  |  18  ----------------------------<  85  4.4   |  22  |  0.91    Ca    8.7      10 Nov 2022 11:48        A/P: Patient is a 57y y/o Male s/p Right TKA, POD # 0  -wound care, knee extension/leg elevation, cryocuff, isometric exercises, new medications reviewed with pt  -Pain control/analgesia reviewed   -Inc spirometry reviewed with pt, demonstrated competence  -DVT prophylaxis with Venodynes/Aspirin  -RRT activation for vasovagal. EKG done: NSR. F/U CE's, CBC, BMP  -Pain: toradol   -PT/OT/WBAT  -complete prophylactic Antibiotic  -medical consult pending   -DC planning: home tomorrow with home care.     
Patient is seen and examined at bedside. Denies CP/SOB/Dizziness/N/V/D/HA. Pain is controlled.     Vital Signs Last 24 Hrs  T(C): 36.6 (11 Nov 2022 08:10), Max: 36.9 (10 Nov 2022 15:00)  T(F): 97.9 (11 Nov 2022 08:10), Max: 98.4 (10 Nov 2022 15:00)  HR: 78 (11 Nov 2022 08:10) (54 - 83)  BP: 129/85 (11 Nov 2022 08:10) (102/65 - 136/86)  BP(mean): --  RR: 18 (11 Nov 2022 08:10) (10 - 18)  SpO2: 96% (11 Nov 2022 08:10) (95% - 100%)    Parameters below as of 11 Nov 2022 08:10  Patient On (Oxygen Delivery Method): room air          PHYSICAL EXAM:  General: NAD, WDWN.   Neuro:  Alert & responsive  HEENT: NCAT, EOMI, conjunctiva clear  abd: soft, NT/ND  Right LE: Prineo dressing C/D/I. Motor intact + EHL/FHL/TA/GS.  Sensation is grossly intact.  Extremity warm, compartments soft, compressible. No calf tenderness. DP 2+   Left LE: Motor intact +EHL/FHL/TA/GS. Sensation is grossly intact. Extremity warm, compartments soft, compressible. No calf tenderness. DP2+    Labs:                          11.1   8.11  )-----------( 179      ( 11 Nov 2022 06:25 )             34.2       11-11    139  |  108  |  15  ----------------------------<  117<H>  4.4   |  27  |  0.70    Ca    8.1<L>      11 Nov 2022 06:25    TPro  6.7  /  Alb  3.3  /  TBili  0.3  /  DBili  x   /  AST  20  /  ALT  29  /  AlkPhos  55  11-10      A/P: Patient is a 57y y/o Male s/p right TKA, POD # 1  -wound care, knee extension/leg elevation, cryocuff, isometric exercises, new medications reviewed with pt  -Pain control/analgesia reviewed   -Inc spirometry reviewed with pt, demonstrated competence  -DVT prophylaxis with Venodynes/Aspirin  -PT/OT/WBAT  -medical consult reviewed   -DC planning: home today with home care  -D/W Dr Cartagena

## 2022-11-11 NOTE — DISCHARGE NOTE NURSING/CASE MANAGEMENT/SOCIAL WORK - NSDCFUADDAPPT_GEN_ALL_CORE_FT
Follow up with your surgeon in two weeks. Call for appointment.    If you need more pain medications, call your surgeon's office. For medication refills or authorizations call [phone]    Call and schedule a follow up appointment with your primary care physician for repeat blood work (CBC and BMP) for post hospital discharge follow-up care.    Call your surgeon if you have increased redness/pain/drainage or fever. Return to ER for shortness of breath/calf tenderness.

## 2022-11-11 NOTE — DISCHARGE NOTE NURSING/CASE MANAGEMENT/SOCIAL WORK - PATIENT PORTAL LINK FT
You can access the FollowMyHealth Patient Portal offered by [facility] by registering at the following website: [email or web address]. By joining [employer]’s FollowMyHealth portal, you will also be able to view your health information using other applications (apps) compatible with our system.

## 2022-11-11 NOTE — OCCUPATIONAL THERAPY INITIAL EVALUATION ADULT - GENERAL OBSERVATIONS, REHAB EVAL
Pt encountered standing at sink w/ RW & aide finishing up UB grooming & hygiene task, all lines intact, NAD, pt reported 4/10 pain s/p R TKA, pt agreeable to OT eval

## 2022-11-13 DIAGNOSIS — E78.5 HYPERLIPIDEMIA, UNSPECIFIED: ICD-10-CM

## 2022-11-13 DIAGNOSIS — E66.9 OBESITY, UNSPECIFIED: ICD-10-CM

## 2022-11-13 DIAGNOSIS — I10 ESSENTIAL (PRIMARY) HYPERTENSION: ICD-10-CM

## 2022-11-13 DIAGNOSIS — M17.11 UNILATERAL PRIMARY OSTEOARTHRITIS, RIGHT KNEE: ICD-10-CM

## 2022-11-22 ENCOUNTER — APPOINTMENT (OUTPATIENT)
Dept: ORTHOPEDIC SURGERY | Facility: CLINIC | Age: 57
End: 2022-11-22

## 2022-11-22 DIAGNOSIS — Z96.641 PRESENCE OF RIGHT ARTIFICIAL HIP JOINT: ICD-10-CM

## 2022-11-22 PROBLEM — E78.5 HYPERLIPIDEMIA, UNSPECIFIED: Chronic | Status: ACTIVE | Noted: 2022-10-24

## 2022-11-22 PROBLEM — I10 ESSENTIAL (PRIMARY) HYPERTENSION: Chronic | Status: ACTIVE | Noted: 2022-10-24

## 2022-11-22 PROCEDURE — 73562 X-RAY EXAM OF KNEE 3: CPT | Mod: RT

## 2022-11-22 PROCEDURE — 99024 POSTOP FOLLOW-UP VISIT: CPT

## 2022-11-22 RX ORDER — OXYCODONE 5 MG/1
5 TABLET ORAL
Qty: 25 | Refills: 0 | Status: ACTIVE | COMMUNITY
Start: 2022-11-22 | End: 1900-01-01

## 2022-11-22 NOTE — PHYSICAL EXAM
[de-identified] : Effected side: right \par Incision is clean and dry with no drainage - dressing removed -\par Sensation intact\par +1 edema LE\par Decreased ROM: \par ROM 7-80 \par \par Xray 3 views knee - Implants good position and well fixed - no fracture or dislocation\par

## 2022-11-22 NOTE — ASSESSMENT
[FreeTextEntry1] : Previous doc:\par 10/26/21: Right knee with moderate OA and large effusion. Pain has been persisting for 3 months. Pt concerned due to years of favoring left knee and his right knee has worsened. Possibly consequentially injured from years of pain on left knee has aggravated the right knee. Will try conservative therapy today including csi. Rtc in 4 weeks.\par 11/23/21: Short term relief from inj. MRI right knee eval for MMT as he only has mod OA. Will also get auth for\par orthovisc.\par 12/7/21: Left TKA 2018 doing very well. Right knee MRI showing adv OA medially with large MMT, some damage to ACL as well. He has medial and lateral symptoms and given his good outcome with left knee he wishes to proceed with right TKA. Declined option for scope and uni and I think TKA is his best option at this point. No medical issues.\par 1/4/22: Worsening pain and more fluid and swelling in the knee. Advanced OA - Been using OTC meds with no relief , HEP for the last few months - Today will asp/inj as the pain is significantly worse. I don't feel any formal PT will help him as he has done HEP from his previous surgery and his OA is advanced\par 2/1/22: Cont pain - TKA granted without prejudice but need full auth to proceed.\par 3/4/22: continued pain, awaiting for surgery, will contact  again, awaiting for authorization.\par 5/10/22: Asp/CSI R knee today. Awaiting deposition to determine auth for surgery. \par 8/9/22:continued and worsening pain in right knee - still not working waiting for auth for TKA \par \par 11/22/22: 2 weeks postop R TKA - Only had 4 sessions of PT, ROM only to 80/85 today. Will try to be around 100/110 at next visit in 4 weeks. Renewed Oxycodone. Start PT/HEP

## 2022-11-22 NOTE — HISTORY OF PRESENT ILLNESS
[Dull/Aching] : dull/aching [Tightness] : tightness [Intermittent] : intermittent [de-identified] : 11/22/22: 2 weeks postop R TKA doing okay with controlled  pain. No fevers/chills. Only has had 4 sessions of at home PT. \par  \par Previous doc:\par Mr. Donte Figueroa, a 56-year-old male, with history of L TKA in 2018, presents today for right knee pain for 2 months. Reports pain is localized to lateral, medial, and posterior portions of knee. Notes pain feels similar to pain prior to L TKA. Reports using Aleve with some improvement.\par 11/23/21: 2 weeks relief from inj then pain returned. Very swollen today.\par 12/7/21: F/U MRI right knee. Cont pain.\par 1/4/22: Continued pain in the right knee and loss of ROM\par 2/1/22: Cont pain, TKA was granted w/o prejudice.\par 3/4/22: continued pain, awaiting surgery. \par 5/10/22: Here for f/up of his right knee. He spoke with the  2 weeks ago and was informed a deposition will be set up with Dr. Cartagena to determine auth for R TKA. Admits to some swelling and continued pain today\par 8/9/22: continued and worsening pain in right knee- Waiting for surg auth. Asp/inj helped for a few weeks but continued pain.  [] : no [FreeTextEntry1] : right knee  [FreeTextEntry5] : ED is here today for post op visit of right knee. pt states there is swelling and stiffness in knee.  [de-identified] : home PT [de-identified] : 11/10/22 [de-identified] : REFUGIO VINCENT

## 2022-11-22 NOTE — DISCUSSION/SUMMARY
[de-identified] : The incision was inspected and was clean and dry with no drainage.  The patient was instructed to call for fevers, chills, wound drainage, wound opening, redness, or any other concerns.\par \par Entered by Nina Rosales acting as scribe.\par

## 2022-12-18 NOTE — PATIENT PROFILE ADULT - DO YOU LACK THE NECESSARY SUPPORT TO HELP YOU COPE WITH LIFE CHALLENGES?
Patient : Tenisha Jacobson Age: 12 month old Sex: female   MRN: [id number] Encounter Date: 12/18/2022      History     Chief Complaint   Patient presents with   • Cough     HPI    History is reported by the parent.      12-month-old female is brought to the emergency department for evaluation of cough and congestion.  Symptoms started approximately 1.5 weeks ago.  Was seen at urgent care 4 days ago and diagnosed with RSV.  Mother is concerned because symptoms continue.  Patient is afebrile and nontoxic.  She denies patient gasping for air or any abdominal breathing.  Normal oral intake.  Normal wet and dirty diapers.  Acting appropriate for age.  Patient is up-to-date on immunizations.  Unknown ill contacts.  No recent antibiotic use.  Mother has been using snot suctioning to help with symptoms.      No Known Allergies    There are no discharge medications for this patient.      No past medical history on file.    No past surgical history on file.    No family history on file.    Social History     Tobacco Use   • Smoking status: Never   • Smokeless tobacco: Never   Vaping Use   • Vaping Use: never used       E-cigarette/Vaping   • E-Cigarette/Vaping Use Never Used      E-Cigarette/Vaping Substances & Devices       Review of Systems   Constitutional: Negative.    HENT: Positive for congestion.    Respiratory: Positive for cough.    Cardiovascular: Negative.    Gastrointestinal: Negative.    Genitourinary: Negative.    Musculoskeletal: Negative.    Skin: Negative.    Neurological: Negative.        Physical Exam     Vitals:    12/18/22 1633 12/18/22 1638   Pulse: 117    Resp: (!) 16    Temp: 97.3 °F (36.3 °C) 99.5 °F (37.5 °C)   TempSrc: Oral Rectal   SpO2: 100% 100%      Physical Exam  Vitals and nursing note reviewed.   Constitutional:       General: She is active, playful and smiling. She is not in acute distress.     Appearance: She is well-developed. She is not ill-appearing, toxic-appearing or diaphoretic.   HENT:       Head: Normocephalic and atraumatic.      Right Ear: Tympanic membrane, ear canal and external ear normal.      Left Ear: Tympanic membrane, ear canal and external ear normal.      Nose: Rhinorrhea (mucoid) present. No congestion.      Mouth/Throat:      Mouth: Mucous membranes are moist. No oral lesions.      Dentition: No gingival swelling.      Pharynx: Oropharynx is clear. No pharyngeal swelling.      Tonsils: No tonsillar exudate. 0 on the right. 0 on the left.   Eyes:      General: Lids are normal.      No periorbital edema, erythema, tenderness or ecchymosis on the right side. No periorbital edema, erythema, tenderness or ecchymosis on the left side.      Conjunctiva/sclera: Conjunctivae normal.      Pupils: Pupils are equal, round, and reactive to light.   Neck:      Trachea: Phonation normal.   Cardiovascular:      Rate and Rhythm: Normal rate and regular rhythm.   Pulmonary:      Effort: Pulmonary effort is normal. No accessory muscle usage, respiratory distress, nasal flaring, grunting or retractions.      Breath sounds: Normal breath sounds and air entry. No stridor, decreased air movement or transmitted upper airway sounds. No decreased breath sounds, wheezing, rhonchi or rales.   Abdominal:      General: Bowel sounds are normal.      Palpations: Abdomen is soft. Abdomen is not rigid.      Tenderness: There is no abdominal tenderness. There is no guarding or rebound.   Musculoskeletal:      Cervical back: Neck supple.   Skin:     General: Skin is warm and dry.      Findings: No lesion or rash.   Neurological:      Mental Status: She is alert and oriented for age.      Motor: She walks.         ED Course     Procedures    Lab Results     Results for orders placed or performed during the hospital encounter of 12/18/22   COVID/Flu/RSV panel   Result Value Ref Range    Rapid SARS-COV-2 by PCR Not Detected Not Detected / Detected / Presumptive Positive / Inhibitors present    Influenza A by PCR Not  Detected Not Detected    Influenza B by PCR Not Detected Not Detected    RSV BY PCR Not Detected Not Detected    Isolation Guidelines      Procedural Comment       Radiology Results     Imaging Results          XR CHEST AP OR PA - PORTABLE (Final result)  Result time 12/18/22 19:25:16    Final result                 Impression:    IMPRESSION:  Portable frontal view of the chest is within normal  limits.      Signed by: Petey Sanon             Narrative:    INTERPRETATION LOCATION: [facility]    PROCEDURE: CHEST, FRONTAL VIEW, PORTABLE    DATE:  12/18/2022 17:58    COMPARISONS: 2021    CLINICAL INDICATION/ORDERING DIAGNOSES:  cough, congestion      FINDINGS: The heart and mediastinum are within normal limits.    The lungs are clear.    There is no evidence of pleural effusion or pneumothorax.                                  ED Medication Orders (From admission, onward)    None               MDM    This patient is a 12 month old female with a chief complaint of cough, congestion. Vitals and physical exam as above. EPIC records were reviewed. DDx was considered but not limited to  URI, COVID, influenza, RSV, pulmonary infection, allergies, etc..    Lab work was ordered and reviewed.  COVID/flu/RSV swab was negative..     A chest x-ray was also ordered and reviewed by radiology. Imaging shows no acute abnormality; full report above.    A cardiac monitor and oximeter were placed during the patient's Emergency Department stay.  Patient is afebrile and nontoxic in appearance, vital signs stable on room air.    The patient was discharged home with a diagnosis of upper respiratory tract infection. The parent was advised to rest, monitor symptoms closely, increase fluids, continue snot suctioning as needed, may use nasal saline drops as well, otc tylenol/motrin prn pain/fevers (if able to take). It was recommended for the parent to call Pediatrician to schedule a follow up appointment and to return to the  Emergency Department as needed if symptoms worsen. The parent was advises patient is vitally stable, x-ray is clear, no evidence of infection, no prescriptions are necessary. The patient's vital signs and condition were closely observed while undergoing evaluation in the Emergency Department. The parent agreed with the plan for care.     All questions and concerns were addressed. Parent was thoroughly educated on diagnosis, including treatment plan and possible etiologies. Parent was in agreement with discharge to home, no additional questions or concerns. The parent is aware they may return to the Emergency department at any time for re-valuation if needed.      Clinical Impression     ED Diagnosis   1. Upper respiratory tract infection, unspecified type          Disposition        Discharge 12/18/2022  7:24 PM  Tenisha Jacobson discharge to home/self care.      The supervising physician for services performed today is Dr. Luong.    NICKOLAS Jean PA-C  12/19/22 0050     no

## 2022-12-20 ENCOUNTER — APPOINTMENT (OUTPATIENT)
Dept: ORTHOPEDIC SURGERY | Facility: CLINIC | Age: 57
End: 2022-12-20

## 2022-12-20 VITALS — WEIGHT: 220 LBS | HEIGHT: 69.5 IN | BODY MASS INDEX: 31.85 KG/M2

## 2022-12-20 PROCEDURE — 99024 POSTOP FOLLOW-UP VISIT: CPT

## 2022-12-20 PROCEDURE — 73562 X-RAY EXAM OF KNEE 3: CPT | Mod: RT

## 2022-12-21 NOTE — HISTORY OF PRESENT ILLNESS
[10] : 10 [6] : 6 [Tightness] : tightness [Constant] : constant [Household chores] : household chores [Leisure] : leisure [Sleep] : sleep [Meds] : meds [Walking] : walking [Exercising] : exercising [de-identified] : 12/20/22: 6 weeks postop R TKA continues to have pain but still has stiffness. He has been going to PT with good benefit but is struggling with ROM. \par  \par Previous doc:\par Mr. Donte Figueroa, a 56-year-old male, with history of L TKA in 2018, presents today for right knee pain for 2 months. Reports pain is localized to lateral, medial, and posterior portions of knee. Notes pain feels similar to pain prior to L TKA. Reports using Aleve with some improvement.\par 11/23/21: 2 weeks relief from inj then pain returned. Very swollen today.\par 12/7/21: F/U MRI right knee. Cont pain.\par 1/4/22: Continued pain in the right knee and loss of ROM\par 2/1/22: Cont pain, TKA was granted w/o prejudice.\par 3/4/22: continued pain, awaiting surgery. \par 5/10/22: Here for f/up of his right knee. He spoke with the  2 weeks ago and was informed a deposition will be set up with Dr. Cartagena to determine auth for R TKA. Admits to some swelling and continued pain today\par 8/9/22: continued and worsening pain in right knee- Waiting for surg auth. Asp/inj helped for a few weeks but continued pain. \par 11/22/22: 2 weeks postop R TKA doing okay with controlled  pain. No fevers/chills. Only has had 4 sessions of at home PT.  [] : no [FreeTextEntry5] : ED LEWIS is a 57 year old M here for PO #2 for the right knee. Pt states that he's doing well since his last visit. He states he experiences while doing exercises and physical therapy.  Pain depends on activity level. [FreeTextEntry1] : Right knee [FreeTextEntry9] : Aleve [de-identified] : 12/16/22 [de-identified] : 11/10/22 [de-identified] : Right total knee arthroplasty

## 2022-12-21 NOTE — DISCUSSION/SUMMARY
Talked to Mom; she states child was seen on 3/11, diagnosed with a sinus infection and strep throat and prescribed Augmentin. Mom is calling today concerned that child is complaining of a sore throat. She states that typically when child has a sinus infection he needs a 20 day course of antibiotics. She is asking if the same might be true for strep throat. Told Mom message would be forwarded to MD to advise. Mom agrees with plan.     MD: Please advise    Awaiting call back   [de-identified] : The incision was inspected and was well healed.  The patient was instructed to call for fevers, chills, wound drainage, wound opening, redness, or any other concerns. The patient was advised of the diagnosis.  The natural history of the pathology was explained in full to the patient in layman's terms. All questions were answered.  The risks and benefits of surgical and non-surgical treatment alternatives were explained in full to the patient.\par \par Entered by Nina Rosales acting as scribe.\par

## 2022-12-21 NOTE — ASSESSMENT
[FreeTextEntry1] : Previous doc:\par 10/26/21: Right knee with moderate OA and large effusion. Pain has been persisting for 3 months. Pt concerned due to years of favoring left knee and his right knee has worsened. Possibly consequentially injured from years of pain on left knee has aggravated the right knee. Will try conservative therapy today including csi. Rtc in 4 weeks.\par 11/23/21: Short term relief from inj. MRI right knee eval for MMT as he only has mod OA. Will also get auth for\par orthovisc.\par 12/7/21: Left TKA 2018 doing very well. Right knee MRI showing adv OA medially with large MMT, some damage to ACL as well. He has medial and lateral symptoms and given his good outcome with left knee he wishes to proceed with right TKA. Declined option for scope and uni and I think TKA is his best option at this point. No medical issues.\par 1/4/22: Worsening pain and more fluid and swelling in the knee. Advanced OA - Been using OTC meds with no relief , HEP for the last few months - Today will asp/inj as the pain is significantly worse. I don't feel any formal PT will help him as he has done HEP from his previous surgery and his OA is advanced\par 2/1/22: Cont pain - TKA granted without prejudice but need full auth to proceed.\par 3/4/22: continued pain, awaiting for surgery, will contact  again, awaiting for authorization.\par 5/10/22: Asp/CSI R knee today. Awaiting deposition to determine auth for surgery. \par 8/9/22:continued and worsening pain in right knee - still not working waiting for auth for TKA \par 11/22/22: 2 weeks postop R TKA - Only had 4 sessions of PT, ROM only to 80/85 today. Will try to be around 100/110 at next visit in 4 weeks. Renewed Oxycodone. Start PT/HEP\par \par 12/20/22: His ROM is okay but has a firm endpoint to 85 degrees today. I feel he will benefit from LORRIE but he would like to continue to work with PT in the next 2-3 weeks. He had some difficulty with stiffness in left knee but it was not as significant. Continue PT for now. If he is not up to 110 degrees, will proceed with LORRIE.

## 2022-12-21 NOTE — PHYSICAL EXAM
[de-identified] : Effected side: right \par Incision well healed\par Sensation intact\par +1 edema LE \par ROM 7-80 \par \par Xray 3 views knee - Implants good position and well fixed - no fracture or dislocation\par

## 2023-01-01 NOTE — BRIEF OPERATIVE NOTE - POST-OP DX
Primary osteoarthritis of left knee  02/28/2018    Active  Gaurang Knutson [id number]:1-3 days|| ||00\01||False; [id number]:1-3 days|| ||00\01||False;

## 2023-01-10 ENCOUNTER — APPOINTMENT (OUTPATIENT)
Dept: ORTHOPEDIC SURGERY | Facility: CLINIC | Age: 58
End: 2023-01-10
Payer: OTHER MISCELLANEOUS

## 2023-01-10 VITALS — WEIGHT: 220 LBS | BODY MASS INDEX: 31.85 KG/M2 | HEIGHT: 69.5 IN

## 2023-01-10 PROCEDURE — 99024 POSTOP FOLLOW-UP VISIT: CPT

## 2023-01-10 NOTE — PHYSICAL EXAM
[de-identified] : Effected side: right \par Incision well healed\par Sensation intact\par ROM 0-90

## 2023-01-10 NOTE — HISTORY OF PRESENT ILLNESS
[10] : 10 [5] : 5 [Dull/Aching] : dull/aching [Localized] : localized [Retired] : Work status: retired [de-identified] : 1/10/23: Feeling better since last time but still stiff.\par  \par Previous doc:\par Mr. Donte Figueroa, a 56-year-old male, with history of L TKA in 2018, presents today for right knee pain for 2 months. Reports pain is localized to lateral, medial, and posterior portions of knee. Notes pain feels similar to pain prior to L TKA. Reports using Aleve with some improvement.\par 11/23/21: 2 weeks relief from inj then pain returned. Very swollen today.\par 12/7/21: F/U MRI right knee. Cont pain.\par 1/4/22: Continued pain in the right knee and loss of ROM\par 2/1/22: Cont pain, TKA was granted w/o prejudice.\par 3/4/22: continued pain, awaiting surgery. \par 5/10/22: Here for f/up of his right knee. He spoke with the  2 weeks ago and was informed a deposition will be set up with Dr. Cartagena to determine auth for R TKA. Admits to some swelling and continued pain today\par 8/9/22: continued and worsening pain in right knee- Waiting for surg auth. Asp/inj helped for a few weeks but continued pain. \par 11/22/22: 2 weeks postop R TKA doing okay with controlled  pain. No fevers/chills. Only has had 4 sessions of at home PT. \par 12/20/22: 6 weeks postop R TKA continues to have pain but still has stiffness. He has been going to PT with good benefit but is struggling with ROM.  [] : This patient has had an injection before: no [FreeTextEntry1] : R Knee [FreeTextEntry3] : 11/10/22 [FreeTextEntry5] : 58 Y/O RHD M eval R Knee S/P R TKA on above DOS pt states recovery is going well with a great deal of pain persisting with activity  [de-identified] : PT 3x wkly  [de-identified] : MTA   [de-identified] : 11/10/22 [de-identified] : REFUGIO VINCENT

## 2023-01-10 NOTE — WORK
[Total] : total [Does not reveal pre-existing condition(s) that may affect treatment/prognosis] : does not reveal pre-existing condition(s) that may affect treatment/prognosis [Cannot return to work because ________] : cannot return to work because [unfilled] [N/A] : : Not Applicable [Patient] : patient [No Rx restrictions] : No Rx restrictions. [I provided the services listed above] :  I provided the services listed above. [FreeTextEntry1] : guarded

## 2023-01-10 NOTE — DISCUSSION/SUMMARY
[de-identified] : The patient was advised of the diagnosis.  The natural history of the pathology was explained in full to the patient in layman's terms. All questions were answered.  The risks and benefits of surgical and non-surgical treatment alternatives were explained in full to the patient.\par

## 2023-01-10 NOTE — ASSESSMENT
[FreeTextEntry1] : Previous doc:\par 10/26/21: Right knee with moderate OA and large effusion. Pain has been persisting for 3 months. Pt concerned due to years of favoring left knee and his right knee has worsened. Possibly consequentially injured from years of pain on left knee has aggravated the right knee. Will try conservative therapy today including csi. Rtc in 4 weeks.\par 11/23/21: Short term relief from inj. MRI right knee eval for MMT as he only has mod OA. Will also get auth for\par orthovisc.\par 12/7/21: Left TKA 2018 doing very well. Right knee MRI showing adv OA medially with large MMT, some damage to ACL as well. He has medial and lateral symptoms and given his good outcome with left knee he wishes to proceed with right TKA. Declined option for scope and uni and I think TKA is his best option at this point. No medical issues.\par 1/4/22: Worsening pain and more fluid and swelling in the knee. Advanced OA - Been using OTC meds with no relief , HEP for the last few months - Today will asp/inj as the pain is significantly worse. I don't feel any formal PT will help him as he has done HEP from his previous surgery and his OA is advanced\par 2/1/22: Cont pain - TKA granted without prejudice but need full auth to proceed.\par 3/4/22: continued pain, awaiting for surgery, will contact  again, awaiting for authorization.\par 5/10/22: Asp/CSI R knee today. Awaiting deposition to determine auth for surgery. \par 8/9/22:continued and worsening pain in right knee - still not working waiting for auth for TKA \par 11/22/22: 2 weeks postop R TKA - Only had 4 sessions of PT, ROM only to 80/85 today. Will try to be around 100/110 at next visit in 4 weeks. Renewed Oxycodone. Start PT/HEP\par 12/20/22: His ROM is okay but has a firm endpoint to 85 degrees today. I feel he will benefit from LORRIE but he would like to continue to work with PT in the next 2-3 weeks. He had some difficulty with stiffness in left knee but it was not as significant. Continue PT for now. If he is not up to 110 degrees, will proceed with LORRIE.\par \par 1/10/23: He is overall doing okay with less pain w/o PT. His ROM is to 90 with a firm end point. His L TKA is about 0-120. I feel it is best to proceed with ASAP LORRIE. I believe this will speed up his progress. Risks and benefits discussed.

## 2023-01-19 NOTE — PHYSICAL THERAPY INITIAL EVALUATION ADULT - LEVEL OF INDEPENDENCE: GAIT, REHAB EVAL
A left gas permeable lens will be ordered with a new power. The clinic will call when lens arrives to set u appointment time to try.  Follow up with Dr. Van as instructed    
independent

## 2023-03-03 ENCOUNTER — APPOINTMENT (OUTPATIENT)
Dept: ORTHOPEDIC SURGERY | Facility: CLINIC | Age: 58
End: 2023-03-03
Payer: OTHER MISCELLANEOUS

## 2023-03-03 VITALS — BODY MASS INDEX: 32.29 KG/M2 | WEIGHT: 223 LBS | HEIGHT: 69.5 IN

## 2023-03-03 PROCEDURE — 99072 ADDL SUPL MATRL&STAF TM PHE: CPT

## 2023-03-03 PROCEDURE — 99213 OFFICE O/P EST LOW 20 MIN: CPT

## 2023-03-03 NOTE — ASSESSMENT
[FreeTextEntry1] : Previous doc:\par 10/26/21: Right knee with moderate OA and large effusion. Pain has been persisting for 3 months. Pt concerned due to years of favoring left knee and his right knee has worsened. Possibly consequentially injured from years of pain on left knee has aggravated the right knee. Will try conservative therapy today including csi. Rtc in 4 weeks.\par 11/23/21: Short term relief from inj. MRI right knee eval for MMT as he only has mod OA. Will also get auth for\par orthovisc.\par 12/7/21: Left TKA 2018 doing very well. Right knee MRI showing adv OA medially with large MMT, some damage to ACL as well. He has medial and lateral symptoms and given his good outcome with left knee he wishes to proceed with right TKA. Declined option for scope and uni and I think TKA is his best option at this point. No medical issues.\par 1/4/22: Worsening pain and more fluid and swelling in the knee. Advanced OA - Been using OTC meds with no relief , HEP for the last few months - Today will asp/inj as the pain is significantly worse. I don't feel any formal PT will help him as he has done HEP from his previous surgery and his OA is advanced\par 2/1/22: Cont pain - TKA granted without prejudice but need full auth to proceed.\par 3/4/22: continued pain, awaiting for surgery, will contact  again, awaiting for authorization.\par 5/10/22: Asp/CSI R knee today. Awaiting deposition to determine auth for surgery. \par 8/9/22:continued and worsening pain in right knee - still not working waiting for auth for TKA \par 11/22/22: 2 weeks postop R TKA - Only had 4 sessions of PT, ROM only to 80/85 today. Will try to be around 100/110 at next visit in 4 weeks. Renewed Oxycodone. Start PT/HEP\par 12/20/22: His ROM is okay but has a firm endpoint to 85 degrees today. I feel he will benefit from LORRIE but he would like to continue to work with PT in the next 2-3 weeks. He had some difficulty with stiffness in left knee but it was not as significant. Continue PT for now. If he is not up to 110 degrees, will proceed with LORRIE.\par 1/10/23: He is overall doing okay with less pain w/o PT. His ROM is to 90 with a firm end point. His L TKA is about 0-120. I feel it is best to proceed with ASAP LORRIE. I believe this will speed up his progress. Risks and benefits discussed. \par \par 3/3/23: 4 months postop R TKA - He does still some stiffness but ROM is improved from last visit. He is 105 in the office and reports he gets to 122 at PT.  Will continue with PT and transition to HEP. Follow up in 6 weeks. At this point, I do not think he needs LORRIE as he is making progress.

## 2023-03-03 NOTE — HISTORY OF PRESENT ILLNESS
[8] : 8 [5] : 5 [Dull/Aching] : dull/aching [de-identified] : 3/3/23: 4 months postop R TKA - He is overall doing well but he does still have some stiffness. He has not been able to get approval for TKA\par  \par Previous doc:\par Mr. Donte Figueroa, a 56-year-old male, with history of L TKA in 2018, presents today for right knee pain for 2 months. Reports pain is localized to lateral, medial, and posterior portions of knee. Notes pain feels similar to pain prior to L TKA. Reports using Aleve with some improvement.\par 11/23/21: 2 weeks relief from inj then pain returned. Very swollen today.\par 12/7/21: F/U MRI right knee. Cont pain.\par 1/4/22: Continued pain in the right knee and loss of ROM\par 2/1/22: Cont pain, TKA was granted w/o prejudice.\par 3/4/22: continued pain, awaiting surgery. \par 5/10/22: Here for f/up of his right knee. He spoke with the  2 weeks ago and was informed a deposition will be set up with Dr. Cartagena to determine auth for R TKA. Admits to some swelling and continued pain today\par 8/9/22: continued and worsening pain in right knee- Waiting for surg auth. Asp/inj helped for a few weeks but continued pain. \par 11/22/22: 2 weeks postop R TKA doing okay with controlled  pain. No fevers/chills. Only has had 4 sessions of at home PT. \par 12/20/22: 6 weeks postop R TKA continues to have pain but still has stiffness. He has been going to PT with good benefit but is struggling with ROM. \par 1/10/23: Feeling better since last time but still stiff. [de-identified] : doing PT

## 2023-03-03 NOTE — PHYSICAL EXAM
[de-identified] : Effected side: right \par Incision well healed\par Sensation intact\par ROM 0-105\par \par Left knee: 0-125

## 2023-04-25 ENCOUNTER — APPOINTMENT (OUTPATIENT)
Dept: ORTHOPEDIC SURGERY | Facility: CLINIC | Age: 58
End: 2023-04-25
Payer: OTHER MISCELLANEOUS

## 2023-04-25 VITALS — WEIGHT: 222 LBS | BODY MASS INDEX: 31.78 KG/M2 | HEIGHT: 70 IN

## 2023-04-25 PROCEDURE — 99213 OFFICE O/P EST LOW 20 MIN: CPT

## 2023-04-25 NOTE — WORK
[Does not reveal pre-existing condition(s) that may affect treatment/prognosis] : does not reveal pre-existing condition(s) that may affect treatment/prognosis [Cannot return to work because ________] : cannot return to work because [unfilled] [N/A] : : Not Applicable [Patient] : patient [No Rx restrictions] : No Rx restrictions. [I provided the services listed above] :  I provided the services listed above. [FreeTextEntry1] : guarded

## 2023-04-25 NOTE — DISCUSSION/SUMMARY
[de-identified] : The patient was advised of the diagnosis.  The natural history of the pathology was explained in full to the patient in layman's terms. All questions were answered.  The risks and benefits of surgical and non-surgical treatment alternatives were explained in full to the patient.\par \par Entered by Nina Rosales acting as scribe.\par

## 2023-04-25 NOTE — HISTORY OF PRESENT ILLNESS
[8] : 8 [6] : 6 [de-identified] : 4/25/23: 5 months s/p R TKA - He is overall doing well but he continues to have some pain, especially with prolonged walking. He has transitioned to going to the gym. \par  \par Previous doc:\par Mr. Donte Figueroa, a 56-year-old male, with history of L TKA in 2018, presents today for right knee pain for 2 months. Reports pain is localized to lateral, medial, and posterior portions of knee. Notes pain feels similar to pain prior to L TKA. Reports using Aleve with some improvement.\par 11/23/21: 2 weeks relief from inj then pain returned. Very swollen today.\par 12/7/21: F/U MRI right knee. Cont pain.\par 1/4/22: Continued pain in the right knee and loss of ROM\par 2/1/22: Cont pain, TKA was granted w/o prejudice.\par 3/4/22: continued pain, awaiting surgery. \par 5/10/22: Here for f/up of his right knee. He spoke with the  2 weeks ago and was informed a deposition will be set up with Dr. Cartagena to determine auth for R TKA. Admits to some swelling and continued pain today\par 8/9/22: continued and worsening pain in right knee- Waiting for surg auth. Asp/inj helped for a few weeks but continued pain. \par 11/22/22: 2 weeks postop R TKA doing okay with controlled  pain. No fevers/chills. Only has had 4 sessions of at home PT. \par 12/20/22: 6 weeks postop R TKA continues to have pain but still has stiffness. He has been going to PT with good benefit but is struggling with ROM. \par 1/10/23: Feeling better since last time but still stiff.\par 3/3/23: 4 months postop R TKA - He is overall doing well but he does still have some stiffness. He has not been able to get approval for TKA [FreeTextEntry3] : 10/3/2016

## 2023-04-25 NOTE — PHYSICAL EXAM
[de-identified] : Effected side: right \par Incision well healed\par Sensation intact\par ROM 0-105\par \par Left knee: 0-125

## 2023-04-25 NOTE — ASSESSMENT
[FreeTextEntry1] : Previous doc:\par 10/26/21: Right knee with moderate OA and large effusion. Pain has been persisting for 3 months. Pt concerned due to years of favoring left knee and his right knee has worsened. Possibly consequentially injured from years of pain on left knee has aggravated the right knee. Will try conservative therapy today including csi. Rtc in 4 weeks.\par 11/23/21: Short term relief from inj. MRI right knee eval for MMT as he only has mod OA. Will also get auth for\par orthovisc.\par 12/7/21: Left TKA 2018 doing very well. Right knee MRI showing adv OA medially with large MMT, some damage to ACL as well. He has medial and lateral symptoms and given his good outcome with left knee he wishes to proceed with right TKA. Declined option for scope and uni and I think TKA is his best option at this point. No medical issues.\par 1/4/22: Worsening pain and more fluid and swelling in the knee. Advanced OA - Been using OTC meds with no relief , HEP for the last few months - Today will asp/inj as the pain is significantly worse. I don't feel any formal PT will help him as he has done HEP from his previous surgery and his OA is advanced\par 2/1/22: Cont pain - TKA granted without prejudice but need full auth to proceed.\par 3/4/22: continued pain, awaiting for surgery, will contact  again, awaiting for authorization.\par 5/10/22: Asp/CSI R knee today. Awaiting deposition to determine auth for surgery. \par 8/9/22:continued and worsening pain in right knee - still not working waiting for auth for TKA \par 11/22/22: 2 weeks postop R TKA - Only had 4 sessions of PT, ROM only to 80/85 today. Will try to be around 100/110 at next visit in 4 weeks. Renewed Oxycodone. Start PT/HEP\par 12/20/22: His ROM is okay but has a firm endpoint to 85 degrees today. I feel he will benefit from LORRIE but he would like to continue to work with PT in the next 2-3 weeks. He had some difficulty with stiffness in left knee but it was not as significant. Continue PT for now. If he is not up to 110 degrees, will proceed with LORRIE.\par 1/10/23: He is overall doing okay with less pain w/o PT. His ROM is to 90 with a firm end point. His L TKA is about 0-120. I feel it is best to proceed with ASAP LORRIE. I believe this will speed up his progress. Risks and benefits discussed. \par 3/3/23: 4 months postop R TKA - He does still some stiffness but ROM is improved from last visit. He is 105 in the office and reports he gets to 122 at PT.  Will continue with PT and transition to HEP. Follow up in 6 weeks. At this point, I do not think he needs LORRIE as he is making progress. \par \par 4/25/23: 5 months postop R TKA - He has good ROM and function. He will continue to progress activity as tolerated. Continue HEP/going to the gym and follow up 3 months. He is still totally disabled from any labor work as his knee continues to improve and heal. Will continue to monitor him.

## 2023-05-12 ENCOUNTER — APPOINTMENT (OUTPATIENT)
Dept: ORTHOPEDIC SURGERY | Facility: CLINIC | Age: 58
End: 2023-05-12
Payer: MEDICARE

## 2023-05-12 VITALS — HEIGHT: 70 IN | BODY MASS INDEX: 31.78 KG/M2 | WEIGHT: 222 LBS

## 2023-05-12 PROCEDURE — 99214 OFFICE O/P EST MOD 30 MIN: CPT | Mod: 57

## 2023-05-12 PROCEDURE — 73130 X-RAY EXAM OF HAND: CPT | Mod: RT

## 2023-05-12 NOTE — PHYSICAL EXAM
[] : tenderness over hand [4th] : 4th [5th] : 5th [Metacarpal] : metacarpal [CMC Joint] : CMC joint [Right] : right fingers [FreeTextEntry3] : ulnar sided right hand swellling  [FreeTextEntry9] : fifth metacarpal shaft fx, nondisplaced

## 2023-05-12 NOTE — HISTORY OF PRESENT ILLNESS
[10] : 10 [Sharp] : sharp [Shooting] : shooting [Stabbing] : stabbing [Tightness] : tightness [de-identified] : 57 year old male presenitng with a IRGHT hand injury. He had a hyperextension injury to the RIGHT little finger injury after he tripped and fell down the stiars. \par DOI: 5/12/23  [] : no [FreeTextEntry1] : right hand [FreeTextEntry5] : 56yo male presenting with right hand pain. Reports he hit his right hand on the metal banister while bringing in garbage cans at home this morning.

## 2023-05-12 NOTE — DISCUSSION/SUMMARY
[de-identified] : Discussed the nature of the diagnosis and risk and benefits of different modalities of treatment.\par Xrays reviewed. \par Placed in SAS. \par Activity modification discussed. \par RTO 11 days in GC.

## 2023-05-16 ENCOUNTER — APPOINTMENT (OUTPATIENT)
Dept: ORTHOPEDIC SURGERY | Facility: CLINIC | Age: 58
End: 2023-05-16
Payer: MEDICARE

## 2023-05-16 VITALS — BODY MASS INDEX: 31.78 KG/M2 | WEIGHT: 222 LBS | HEIGHT: 70 IN

## 2023-05-16 PROCEDURE — 99214 OFFICE O/P EST MOD 30 MIN: CPT | Mod: 57

## 2023-05-16 PROCEDURE — 29280 STRAPPING OF HAND OR FINGER: CPT | Mod: RT

## 2023-05-16 PROCEDURE — 26600 TREAT METACARPAL FRACTURE: CPT

## 2023-05-16 NOTE — IMAGING
[de-identified] : right hand:\par ulnar sided swelling\par ttp over 5th MC shaft\par farom\par normal cascade\par nvid

## 2023-05-16 NOTE — ASSESSMENT
[FreeTextEntry1] : The fracture was discussed with the patient at length.  We discussed that although there may be some imperfect alignment on XRay, the patient would likely do best with early motion exercises to minimize stiffness.  We discussed that flexion at the fracture site was well tolerated.  We discussed the importance of good function over good Xrays and that performing surgery may lead to unnecessary scar tissue formation.  We discussed the benefit of attila strapping and early range of motion.  We did discuss the goals of surgery when indicated for malrotation or extreme flexion and what to expect.  The patient may benefit from therapy.  Risks of treatment include, but are not limited to persistent pain, stiffness, fracture displacement, need for future surgery, mal-union, non-union. All patient questions were answered. Patient expressed understanding and would like to proceed with the non operative treatment plan.\par \par The affected finger is noted to be clean and dry.  A attila loop was applied to the affected finger over the middle phalanx. It was then strapped to the adjacent finger in overlapping fashion. Fit and pressure were assessed as the strapping was applied and stopped when the desired amount of support was achieved\par \par F/u in 1.5-2 weeks for xrays

## 2023-05-16 NOTE — HISTORY OF PRESENT ILLNESS
[10] : 10 [5] : 5 [Sharp] : sharp [Shooting] : shooting [Stabbing] : stabbing [Throbbing] : throbbing [Tightness] : tightness [de-identified] : 5/16/23:  Pt tripped and fell down the stairs on 5/12/23 and injured his right hand.  Pt was seen by Dr Willson and was splinted but the splint keeps falling off so he is here for a follow up. [] : Post Surgical Visit: no [FreeTextEntry1] : right hand [FreeTextEntry3] : 5/12/23 [FreeTextEntry5] : 56yo male presenting with right hand pain. Reports he hit his right hand on the metal banister while bringing in garbage cans at home this morning.  [de-identified] :

## 2023-05-16 NOTE — DATA REVIEWED
[Outside X-rays] : outside x-rays [Right] : of the right [Hand] : hand [I independently reviewed and interpreted images and report] : I independently reviewed and interpreted images and report [FreeTextEntry1] : nondisplaced 5th MC shafted fracture

## 2023-05-23 ENCOUNTER — APPOINTMENT (OUTPATIENT)
Dept: ORTHOPEDIC SURGERY | Facility: CLINIC | Age: 58
End: 2023-05-23

## 2023-05-30 ENCOUNTER — APPOINTMENT (OUTPATIENT)
Dept: ORTHOPEDIC SURGERY | Facility: CLINIC | Age: 58
End: 2023-05-30
Payer: MEDICARE

## 2023-05-30 VITALS — HEIGHT: 70 IN | BODY MASS INDEX: 31.78 KG/M2 | WEIGHT: 222 LBS

## 2023-05-30 DIAGNOSIS — S62.326A DISPLACED FRACTURE OF SHAFT OF FIFTH METACARPAL BONE, RIGHT HAND, INITIAL ENCOUNTER FOR CLOSED FRACTURE: ICD-10-CM

## 2023-05-30 PROCEDURE — 73130 X-RAY EXAM OF HAND: CPT | Mod: RT

## 2023-05-30 PROCEDURE — 99024 POSTOP FOLLOW-UP VISIT: CPT

## 2023-05-30 PROCEDURE — 29280 STRAPPING OF HAND OR FINGER: CPT | Mod: 58,RT

## 2023-05-30 NOTE — PRE-OP CHECKLIST - HIBICLENS SHOWER 1 DATE
Received call from pt's  as per [facility]. Pt's name and  verified. They asked about the MRI results. Informed that as per provider's note there are no abnormal findings. They stated that they would like the understand this sentence mentioned in the report (Minimal nonspecific foci of increased T2 signal intensity in cerebral white  matter, chronic microvascular change).  They would like to know if pt needs to see a neurologist. 10-Nov-2022 00:00

## 2023-05-30 NOTE — IMAGING
[Right] : right hand [de-identified] : right hand:\par ulnar sided swelling\par ttp over 5th MC shaft\par mild small finger stiffness is noted\par farom\par normal cascade\par nvid [FreeTextEntry1] : Right hand xray shows:  pt still with acceptable alignment of 5th MC and early callous formation.

## 2023-05-30 NOTE — ASSESSMENT
[FreeTextEntry1] : The fracture was discussed with the patient at length.  We discussed that although there may be some imperfect alignment on XRay, the patient would likely do best with early motion exercises to minimize stiffness.  We discussed that flexion at the fracture site was well tolerated.  We discussed the importance of good function over good Xrays and that performing surgery may lead to unnecessary scar tissue formation.  We discussed the benefit of attila strapping and early range of motion.  We did discuss the goals of surgery when indicated for malrotation or extreme flexion and what to expect.  The patient may benefit from therapy.  Risks of treatment include, but are not limited to persistent pain, stiffness, fracture displacement, need for future surgery, mal-union, non-union. All patient questions were answered. Patient expressed understanding and would like to proceed with the non operative treatment plan.\par \par We reviewed the importance of finger range of motion with attila loop again instructed.  We discussed that while wrist stiffness can be tolerated, finger stiffness causes grave dysfunction and is difficult to overcome once it sets in.  The patient was encouraged to engage in finger range of motion exercises.  A home exercise program was demonstrated and instructions given to begin immediately and to perform the exercises hourly.\par \par f/u in 3 weeks for xrays

## 2023-05-30 NOTE — HISTORY OF PRESENT ILLNESS
[10] : 10 [5] : 5 [Sharp] : sharp [Shooting] : shooting [Stabbing] : stabbing [Throbbing] : throbbing [Tightness] : tightness [de-identified] : 5/16/23:  Pt tripped and fell down the stairs on 5/12/23 and injured his right hand.  Pt was seen by Dr Willson and was splinted but the splint keeps falling off so he is here for a follow up. [] : Post Surgical Visit: no [FreeTextEntry1] : right hand [FreeTextEntry3] : 5/12/23 [FreeTextEntry5] : 58yo male presenting with right hand pain. Reports he hit his right hand on the metal banister while bringing in garbage cans at home this morning.  [de-identified] :

## 2023-07-17 ENCOUNTER — APPOINTMENT (OUTPATIENT)
Dept: ORTHOPEDIC SURGERY | Facility: CLINIC | Age: 58
End: 2023-07-17

## 2023-07-25 ENCOUNTER — APPOINTMENT (OUTPATIENT)
Dept: ORTHOPEDIC SURGERY | Facility: CLINIC | Age: 58
End: 2023-07-25
Payer: OTHER MISCELLANEOUS

## 2023-07-25 VITALS — BODY MASS INDEX: 32.29 KG/M2 | WEIGHT: 223 LBS | HEIGHT: 69.5 IN

## 2023-07-25 PROCEDURE — 99213 OFFICE O/P EST LOW 20 MIN: CPT

## 2023-07-25 NOTE — PHYSICAL EXAM
[de-identified] : Effected side: right \par Incision well healed\par Sensation intact\par ROM 0-115\par \par Left knee: 0-120

## 2023-07-25 NOTE — DISCUSSION/SUMMARY
[de-identified] : The patient was advised of the diagnosis.  The natural history of the pathology was explained in full to the patient in layman's terms. All questions were answered.  The risks and benefits of surgical and non-surgical treatment alternatives were explained in full to the patient.\par \par Entered by Sherrie Ny acting as a scribe.\par \par

## 2023-07-25 NOTE — HISTORY OF PRESENT ILLNESS
[7] : 7 [4] : 4 [Localized] : localized [Sharp] : sharp [Constant] : constant [Meds] : meds [Ice] : ice [Walking] : walking [Not working due to injury] : Work status: not working due to injury [de-identified] : 7/25/23: 8 months s/p R TKA. Pt is feeling good- some pain with physical activity. Taking tylenol or aleeve for pain when needed. Pt ambulates freely. Pt is still going to the gym and doing HEP. \par  \par Previous doc:\par Mr. Donte Figueroa, a 56-year-old male, with history of L TKA in 2018, presents today for right knee pain for 2 months. Reports pain is localized to lateral, medial, and posterior portions of knee. Notes pain feels similar to pain prior to L TKA. Reports using Aleve with some improvement.\par 11/23/21: 2 weeks relief from inj then pain returned. Very swollen today.\par 12/7/21: F/U MRI right knee. Cont pain.\par 1/4/22: Continued pain in the right knee and loss of ROM\par 2/1/22: Cont pain, TKA was granted w/o prejudice.\par 3/4/22: continued pain, awaiting surgery. \par 5/10/22: Here for f/up of his right knee. He spoke with the  2 weeks ago and was informed a deposition will be set up with Dr. Cartagena to determine auth for R TKA. Admits to some swelling and continued pain today\par 8/9/22: continued and worsening pain in right knee- Waiting for surg auth. Asp/inj helped for a few weeks but continued pain. \par 11/22/22: 2 weeks postop R TKA doing okay with controlled  pain. No fevers/chills. Only has had 4 sessions of at home PT. \par 12/20/22: 6 weeks postop R TKA continues to have pain but still has stiffness. He has been going to PT with good benefit but is struggling with ROM. \par 1/10/23: Feeling better since last time but still stiff.\par 3/3/23: 4 months postop R TKA - He is overall doing well but he does still have some stiffness. He has not been able to get approval for TKA\par 4/25/23: 5 months s/p R TKA - He is overall doing well but he continues to have some pain, especially with prolonged walking. He has transitioned to going to the gym. [] : no [FreeTextEntry1] : R knee [FreeTextEntry5] : Pt is here for follow up on R knee. Notes slight improvements since last visit. States he still has pain after walking long distances.  [FreeTextEntry9] : exercising [de-identified] :

## 2023-07-25 NOTE — ASSESSMENT
[FreeTextEntry1] : Previous doc:\par 10/26/21: Right knee with moderate OA and large effusion. Pain has been persisting for 3 months. Pt concerned due to years of favoring left knee and his right knee has worsened. Possibly consequentially injured from years of pain on left knee has aggravated the right knee. Will try conservative therapy today including csi. Rtc in 4 weeks.\par 11/23/21: Short term relief from inj. MRI right knee eval for MMT as he only has mod OA. Will also get auth for\par orthovisc.\par 12/7/21: Left TKA 2018 doing very well. Right knee MRI showing adv OA medially with large MMT, some damage to ACL as well. He has medial and lateral symptoms and given his good outcome with left knee he wishes to proceed with right TKA. Declined option for scope and uni and I think TKA is his best option at this point. No medical issues.\par 1/4/22: Worsening pain and more fluid and swelling in the knee. Advanced OA - Been using OTC meds with no relief , HEP for the last few months - Today will asp/inj as the pain is significantly worse. I don't feel any formal PT will help him as he has done HEP from his previous surgery and his OA is advanced\par 2/1/22: Cont pain - TKA granted without prejudice but need full auth to proceed.\par 3/4/22: continued pain, awaiting for surgery, will contact  again, awaiting for authorization.\par 5/10/22: Asp/CSI R knee today. Awaiting deposition to determine auth for surgery. \par 8/9/22:continued and worsening pain in right knee - still not working waiting for auth for TKA \par 11/22/22: 2 weeks postop R TKA - Only had 4 sessions of PT, ROM only to 80/85 today. Will try to be around 100/110 at next visit in 4 weeks. Renewed Oxycodone. Start PT/HEP\par 12/20/22: His ROM is okay but has a firm endpoint to 85 degrees today. I feel he will benefit from LORRIE but he would like to continue to work with PT in the next 2-3 weeks. He had some difficulty with stiffness in left knee but it was not as significant. Continue PT for now. If he is not up to 110 degrees, will proceed with LORRIE.\par 1/10/23: He is overall doing okay with less pain w/o PT. His ROM is to 90 with a firm end point. His L TKA is about 0-120. I feel it is best to proceed with ASAP LORRIE. I believe this will speed up his progress. Risks and benefits discussed. \par 3/3/23: 4 months postop R TKA - He does still some stiffness but ROM is improved from last visit. He is 105 in the office and reports he gets to 122 at PT.  Will continue with PT and transition to HEP. Follow up in 6 weeks. At this point, I do not think he needs LORRIE as he is making progress. \par 4/25/23: 5 months postop R TKA - He has good ROM and function. He will continue to progress activity as tolerated. Continue HEP/going to the gym and follow up 3 months. He is still totally disabled from any labor work as his knee continues to improve and heal. Will continue to monitor him. \par \par 7/25/23: Pt is progressing nicely, not 100% healed but is making good improvement. Follow up in 3 months and will obtain new XR at that time.

## 2023-10-17 ENCOUNTER — APPOINTMENT (OUTPATIENT)
Dept: ORTHOPEDIC SURGERY | Facility: CLINIC | Age: 58
End: 2023-10-17

## 2023-10-31 ENCOUNTER — RESULT CHARGE (OUTPATIENT)
Age: 58
End: 2023-10-31

## 2023-10-31 ENCOUNTER — APPOINTMENT (OUTPATIENT)
Dept: ORTHOPEDIC SURGERY | Facility: CLINIC | Age: 58
End: 2023-10-31
Payer: OTHER MISCELLANEOUS

## 2023-10-31 VITALS — HEIGHT: 70 IN | WEIGHT: 222 LBS | BODY MASS INDEX: 31.78 KG/M2

## 2023-10-31 PROCEDURE — 99213 OFFICE O/P EST LOW 20 MIN: CPT | Mod: ACP

## 2023-10-31 PROCEDURE — 73562 X-RAY EXAM OF KNEE 3: CPT | Mod: RT

## 2023-11-22 ENCOUNTER — APPOINTMENT (OUTPATIENT)
Dept: ORTHOPEDIC SURGERY | Facility: CLINIC | Age: 58
End: 2023-11-22

## 2023-12-05 ENCOUNTER — APPOINTMENT (OUTPATIENT)
Dept: ORTHOPEDIC SURGERY | Facility: CLINIC | Age: 58
End: 2023-12-05
Payer: OTHER MISCELLANEOUS

## 2023-12-05 DIAGNOSIS — M77.8 OTHER ENTHESOPATHIES, NOT ELSEWHERE CLASSIFIED: ICD-10-CM

## 2023-12-05 DIAGNOSIS — M19.032 PRIMARY OSTEOARTHRITIS, LEFT WRIST: ICD-10-CM

## 2023-12-05 PROCEDURE — 73130 X-RAY EXAM OF HAND: CPT | Mod: LT

## 2023-12-05 PROCEDURE — 99213 OFFICE O/P EST LOW 20 MIN: CPT | Mod: 25

## 2023-12-05 PROCEDURE — 20551 NJX 1 TENDON ORIGIN/INSJ: CPT | Mod: LT

## 2023-12-18 ENCOUNTER — APPOINTMENT (OUTPATIENT)
Dept: ORTHOPEDIC SURGERY | Facility: CLINIC | Age: 58
End: 2023-12-18

## 2024-01-30 ENCOUNTER — APPOINTMENT (OUTPATIENT)
Dept: ORTHOPEDIC SURGERY | Facility: CLINIC | Age: 59
End: 2024-01-30
Payer: OTHER MISCELLANEOUS

## 2024-01-30 VITALS — HEIGHT: 70 IN | WEIGHT: 222 LBS | BODY MASS INDEX: 31.78 KG/M2

## 2024-01-30 PROCEDURE — 99213 OFFICE O/P EST LOW 20 MIN: CPT

## 2024-01-30 NOTE — ASSESSMENT
[FreeTextEntry1] : Previous doc: 10/26/21: Right knee with moderate OA and large effusion. Pain has been persisting for 3 months. Pt concerned due to years of favoring left knee and his right knee has worsened. Possibly consequentially injured from years of pain on left knee has aggravated the right knee. Will try conservative therapy today including csi. Rtc in 4 weeks. 11/23/21: Short term relief from inj. MRI right knee eval for MMT as he only has mod OA. Will also get auth for orthovisc. 12/7/21: Left TKA 2018 doing very well. Right knee MRI showing adv OA medially with large MMT, some damage to ACL as well. He has medial and lateral symptoms and given his good outcome with left knee he wishes to proceed with right TKA. Declined option for scope and uni and I think TKA is his best option at this point. No medical issues. 1/4/22: Worsening pain and more fluid and swelling in the knee. Advanced OA - Been using OTC meds with no relief , HEP for the last few months - Today will asp/inj as the pain is significantly worse. I don't feel any formal PT will help him as he has done HEP from his previous surgery and his OA is advanced 2/1/22: Cont pain - TKA granted without prejudice but need full auth to proceed. 3/4/22: continued pain, awaiting for surgery, will contact  again, awaiting for authorization. 5/10/22: Asp/CSI R knee today. Awaiting deposition to determine auth for surgery. 8/9/22:continued and worsening pain in right knee - still not working waiting for auth for TKA 11/22/22: 2 weeks postop R TKA - Only had 4 sessions of PT, ROM only to 80/85 today. Will try to be around 100/110 at next visit in 4 weeks. Renewed Oxycodone. Start PT/HEP 12/20/22: His ROM is okay but has a firm endpoint to 85 degrees today. I feel he will benefit from LORRIE but he would like to continue to work with PT in the next 2-3 weeks. He had some difficulty with stiffness in left knee but it was not as significant. Continue PT for now. If he is not up to 110 degrees, will proceed with LORRIE. 1/10/23: He is overall doing okay with less pain w/o PT. His ROM is to 90 with a firm end point. His L TKA is about 0-120. I feel it is best to proceed with ASAP LORRIE. I believe this will speed up his progress. Risks and benefits discussed. 3/3/23: 4 months postop R TKA - He does still some stiffness but ROM is improved from last visit. He is 105 in the office and reports he gets to 122 at PT. Will continue with PT and transition to HEP. Follow up in 6 weeks. At this point, I do not think he needs LORRIE as he is making progress. 4/25/23: 5 months postop R TKA - He has good ROM and function. He will continue to progress activity as tolerated. Continue HEP/going to the gym and follow up 3 months. He is still totally disabled from any labor work as his knee continues to improve and heal. Will continue to monitor him. 7/25/23: Pt is progressing nicely, not 100% healed but is making good improvement. Follow up in 3 months and will obtain new XR at that time. 10/31/23: 1 year s/p R TKA. XR look good. Functionally doing very well, no pain. I believe his stiffness will continue to gradually improve- overall I am very happy with his progress as is he. Follow up 3 months  1/30/24: Over 1 year s/p R TKA. Doing very well, no issues, no real pain- some tightness that continues to improve. Overall we are both happy with his outcome. Follow up with Gaurang in 3 months.

## 2024-01-30 NOTE — PHYSICAL EXAM
[de-identified] : Effected side: right  Incision well healed Sensation intact ROM 0-115  Left knee: 0-125

## 2024-01-30 NOTE — DISCUSSION/SUMMARY
[de-identified] : The patient was advised of the diagnosis.  The natural history of the pathology was explained in full to the patient in layman's terms. All questions were answered.  The risks and benefits of surgical and non-surgical treatment alternatives were explained in full to the patient.\par  \par  Entered by Sherrie Ny acting as a scribe.\par  \par

## 2024-01-30 NOTE — HISTORY OF PRESENT ILLNESS
[7] : 7 [6] : 6 [de-identified] : 1/30/24: Over 1 year s/p R TKA. Hx of L TKA 5 years ago also by me. Overall very happy with progression- feels the tightness in the knee continues to improve- not quite as good as the left yet but satisfied with both. States he is active, uses bike and does HEP.    Previous doc: Mr. Donte Figueroa, a 56-year-old male, with history of L TKA in 2018, presents today for right knee pain for 2 months. Reports pain is localized to lateral, medial, and posterior portions of knee. Notes pain feels similar to pain prior to L TKA. Reports using Aleve with some improvement. 11/23/21: 2 weeks relief from inj then pain returned. Very swollen today. 12/7/21: F/U MRI right knee. Cont pain. 1/4/22: Continued pain in the right knee and loss of ROM 2/1/22: Cont pain, TKA was granted w/o prejudice. 3/4/22: continued pain, awaiting surgery.  5/10/22: Here for f/up of his right knee. He spoke with the  2 weeks ago and was informed a deposition will be set up with Dr. Cartagena to determine auth for R TKA. Admits to some swelling and continued pain today 8/9/22: continued and worsening pain in right knee- Waiting for surg auth. Asp/inj helped for a few weeks but continued pain.  11/22/22: 2 weeks postop R TKA doing okay with controlled  pain. No fevers/chills. Only has had 4 sessions of at home PT.  12/20/22: 6 weeks postop R TKA continues to have pain but still has stiffness. He has been going to PT with good benefit but is struggling with ROM.  1/10/23: Feeling better since last time but still stiff. 3/3/23: 4 months postop R TKA - He is overall doing well but he does still have some stiffness. He has not been able to get approval for TKA 4/25/23: 5 months s/p R TKA - He is overall doing well but he continues to have some pain, especially with prolonged walking. He has transitioned to going to the gym. 7/25/23: 8 months s/p R TKA. Pt is feeling good- some pain with physical activity. Taking tylenol or aleeve for pain when needed. Pt ambulates freely. Pt is still going to the gym and doing HEP.  10/31/23: 1 year s/p R TKA. Hx of L TKA 5 years ago. No pain in the RT knee- but some tightness and stiffness. Feels his right knee is significantly improving but not quite as good as the left yet. Overall very happy and satisfied with both.

## 2024-04-23 ENCOUNTER — APPOINTMENT (OUTPATIENT)
Dept: ORTHOPEDIC SURGERY | Facility: CLINIC | Age: 59
End: 2024-04-23
Payer: OTHER MISCELLANEOUS

## 2024-04-23 VITALS — HEIGHT: 70 IN | WEIGHT: 222 LBS | BODY MASS INDEX: 31.78 KG/M2

## 2024-04-23 DIAGNOSIS — M17.11 UNILATERAL PRIMARY OSTEOARTHRITIS, RIGHT KNEE: ICD-10-CM

## 2024-04-23 DIAGNOSIS — Z96.651 PRESENCE OF RIGHT ARTIFICIAL KNEE JOINT: ICD-10-CM

## 2024-04-23 PROCEDURE — 99213 OFFICE O/P EST LOW 20 MIN: CPT

## 2024-04-23 NOTE — DISCUSSION/SUMMARY
[de-identified] : The patient was advised of the diagnosis.  The natural history of the pathology was explained in full to the patient in layman's terms. All questions were answered.  The risks and benefits of surgical and non-surgical treatment alternatives were explained in full to the patient.

## 2024-04-23 NOTE — HISTORY OF PRESENT ILLNESS
[8] : 8 [6] : 6 [Dull/Aching] : dull/aching [de-identified] : 4/23/24: No change, min pain.  Does HEP.  Denies using medications for knee pain.   Previous doc: Mr. Donte Figueroa, a 56-year-old male, with history of L TKA in 2018, presents today for right knee pain for 2 months. Reports pain is localized to lateral, medial, and posterior portions of knee. Notes pain feels similar to pain prior to L TKA. Reports using Aleve with some improvement. 11/23/21: 2 weeks relief from inj then pain returned. Very swollen today. 12/7/21: F/U MRI right knee. Cont pain. 1/4/22: Continued pain in the right knee and loss of ROM 2/1/22: Cont pain, TKA was granted w/o prejudice. 3/4/22: continued pain, awaiting surgery.  5/10/22: Here for f/up of his right knee. He spoke with the  2 weeks ago and was informed a deposition will be set up with Dr. Cartagena to determine auth for R TKA. Admits to some swelling and continued pain today 8/9/22: continued and worsening pain in right knee- Waiting for surg auth. Asp/inj helped for a few weeks but continued pain.  11/22/22: 2 weeks postop R TKA doing okay with controlled  pain. No fevers/chills. Only has had 4 sessions of at home PT.  12/20/22: 6 weeks postop R TKA continues to have pain but still has stiffness. He has been going to PT with good benefit but is struggling with ROM.  1/10/23: Feeling better since last time but still stiff. 3/3/23: 4 months postop R TKA - He is overall doing well but he does still have some stiffness. He has not been able to get approval for TKA 4/25/23: 5 months s/p R TKA - He is overall doing well but he continues to have some pain, especially with prolonged walking. He has transitioned to going to the gym. 7/25/23: 8 months s/p R TKA. Pt is feeling good- some pain with physical activity. Taking tylenol or aleeve for pain when needed. Pt ambulates freely. Pt is still going to the gym and doing HEP.  10/31/23: 1 year s/p R TKA. Hx of L TKA 5 years ago. No pain in the RT knee- but some tightness and stiffness. Feels his right knee is significantly improving but not quite as good as the left yet. Overall very happy and satisfied with both. 1/30/24: Over 1 year s/p R TKA. Hx of L TKA 5 years ago also by me. Overall very happy with progression- feels the tightness in the knee continues to improve- not quite as good as the left yet but satisfied with both. States he is active, uses bike and does HEP.

## 2024-04-23 NOTE — PHYSICAL EXAM
[de-identified] : Effected side: right  Incision well healed Sensation intact ROM 0-115  Left knee: 0-125

## 2024-04-23 NOTE — ASSESSMENT
[FreeTextEntry1] : Previous doc: 10/26/21: Right knee with moderate OA and large effusion. Pain has been persisting for 3 months. Pt concerned due to years of favoring left knee and his right knee has worsened. Possibly consequentially injured from years of pain on left knee has aggravated the right knee. Will try conservative therapy today including csi. Rtc in 4 weeks. 11/23/21: Short term relief from inj. MRI right knee eval for MMT as he only has mod OA. Will also get auth for orthovisc. 12/7/21: Left TKA 2018 doing very well. Right knee MRI showing adv OA medially with large MMT, some damage to ACL as well. He has medial and lateral symptoms and given his good outcome with left knee he wishes to proceed with right TKA. Declined option for scope and uni and I think TKA is his best option at this point. No medical issues. 1/4/22: Worsening pain and more fluid and swelling in the knee. Advanced OA - Been using OTC meds with no relief , HEP for the last few months - Today will asp/inj as the pain is significantly worse. I don't feel any formal PT will help him as he has done HEP from his previous surgery and his OA is advanced 2/1/22: Cont pain - TKA granted without prejudice but need full auth to proceed. 3/4/22: continued pain, awaiting for surgery, will contact  again, awaiting for authorization. 5/10/22: Asp/CSI R knee today. Awaiting deposition to determine auth for surgery. 8/9/22:continued and worsening pain in right knee - still not working waiting for auth for TKA 11/22/22: 2 weeks postop R TKA - Only had 4 sessions of PT, ROM only to 80/85 today. Will try to be around 100/110 at next visit in 4 weeks. Renewed Oxycodone. Start PT/HEP 12/20/22: His ROM is okay but has a firm endpoint to 85 degrees today. I feel he will benefit from LORRIE but he would like to continue to work with PT in the next 2-3 weeks. He had some difficulty with stiffness in left knee but it was not as significant. Continue PT for now. If he is not up to 110 degrees, will proceed with LORRIE. 1/10/23: He is overall doing okay with less pain w/o PT. His ROM is to 90 with a firm end point. His L TKA is about 0-120. I feel it is best to proceed with ASAP LORRIE. I believe this will speed up his progress. Risks and benefits discussed. 3/3/23: 4 months postop R TKA - He does still some stiffness but ROM is improved from last visit. He is 105 in the office and reports he gets to 122 at PT. Will continue with PT and transition to HEP. Follow up in 6 weeks. At this point, I do not think he needs LORRIE as he is making progress. 4/25/23: 5 months postop R TKA - He has good ROM and function. He will continue to progress activity as tolerated. Continue HEP/going to the gym and follow up 3 months. He is still totally disabled from any labor work as his knee continues to improve and heal. Will continue to monitor him. 7/25/23: Pt is progressing nicely, not 100% healed but is making good improvement. Follow up in 3 months and will obtain new XR at that time. 10/31/23: 1 year s/p R TKA. XR look good. Functionally doing very well, no pain. I believe his stiffness will continue to gradually improve- overall I am very happy with his progress as is he. Follow up 3 months 1/30/24: Over 1 year s/p R TKA. Doing very well, no issues, no real pain- some tightness that continues to improve. Overall we are both happy with his outcome. Follow up with Gaurang in 3 months.  4/23/24: Functioning well, min pain but does not feel as good as his left knee.  Cont HEP, return in 3 months.

## 2024-06-19 ENCOUNTER — APPOINTMENT (OUTPATIENT)
Dept: ORTHOPEDIC SURGERY | Facility: CLINIC | Age: 59
End: 2024-06-19

## 2024-07-23 ENCOUNTER — APPOINTMENT (OUTPATIENT)
Dept: ORTHOPEDIC SURGERY | Facility: CLINIC | Age: 59
End: 2024-07-23
Payer: OTHER MISCELLANEOUS

## 2024-07-23 DIAGNOSIS — Z96.651 PRESENCE OF RIGHT ARTIFICIAL KNEE JOINT: ICD-10-CM

## 2024-07-23 DIAGNOSIS — M17.11 UNILATERAL PRIMARY OSTEOARTHRITIS, RIGHT KNEE: ICD-10-CM

## 2024-07-23 PROCEDURE — 99213 OFFICE O/P EST LOW 20 MIN: CPT | Mod: ACP

## 2024-07-23 PROCEDURE — 99213 OFFICE O/P EST LOW 20 MIN: CPT

## 2024-07-23 NOTE — ASSESSMENT
[FreeTextEntry1] : Previous doc: 10/26/21: Right knee with moderate OA and large effusion. Pain has been persisting for 3 months. Pt concerned due to years of favoring left knee and his right knee has worsened. Possibly consequentially injured from years of pain on left knee has aggravated the right knee. Will try conservative therapy today including csi. Rtc in 4 weeks. 11/23/21: Short term relief from inj. MRI right knee eval for MMT as he only has mod OA. Will also get auth for orthovisc. 12/7/21: Left TKA 2018 doing very well. Right knee MRI showing adv OA medially with large MMT, some damage to ACL as well. He has medial and lateral symptoms and given his good outcome with left knee he wishes to proceed with right TKA. Declined option for scope and uni and I think TKA is his best option at this point. No medical issues. 1/4/22: Worsening pain and more fluid and swelling in the knee. Advanced OA - Been using OTC meds with no relief , HEP for the last few months - Today will asp/inj as the pain is significantly worse. I don't feel any formal PT will help him as he has done HEP from his previous surgery and his OA is advanced 2/1/22: Cont pain - TKA granted without prejudice but need full auth to proceed. 3/4/22: continued pain, awaiting for surgery, will contact  again, awaiting for authorization. 5/10/22: Asp/CSI R knee today. Awaiting deposition to determine auth for surgery. 8/9/22:continued and worsening pain in right knee - still not working waiting for auth for TKA 11/22/22: 2 weeks postop R TKA - Only had 4 sessions of PT, ROM only to 80/85 today. Will try to be around 100/110 at next visit in 4 weeks. Renewed Oxycodone. Start PT/HEP 12/20/22: His ROM is okay but has a firm endpoint to 85 degrees today. I feel he will benefit from LORRIE but he would like to continue to work with PT in the next 2-3 weeks. He had some difficulty with stiffness in left knee but it was not as significant. Continue PT for now. If he is not up to 110 degrees, will proceed with LORRIE. 1/10/23: He is overall doing okay with less pain w/o PT. His ROM is to 90 with a firm end point. His L TKA is about 0-120. I feel it is best to proceed with ASAP LORRIE. I believe this will speed up his progress. Risks and benefits discussed. 3/3/23: 4 months postop R TKA - He does still some stiffness but ROM is improved from last visit. He is 105 in the office and reports he gets to 122 at PT. Will continue with PT and transition to HEP. Follow up in 6 weeks. At this point, I do not think he needs LORRIE as he is making progress. 4/25/23: 5 months postop R TKA - He has good ROM and function. He will continue to progress activity as tolerated. Continue HEP/going to the gym and follow up 3 months. He is still totally disabled from any labor work as his knee continues to improve and heal. Will continue to monitor him. 7/25/23: Pt is progressing nicely, not 100% healed but is making good improvement. Follow up in 3 months and will obtain new XR at that time. 10/31/23: 1 year s/p R TKA. XR look good. Functionally doing very well, no pain. I believe his stiffness will continue to gradually improve- overall I am very happy with his progress as is he. Follow up 3 months 1/30/24: Over 1 year s/p R TKA. Doing very well, no issues, no real pain- some tightness that continues to improve. Overall we are both happy with his outcome. Follow up with Gaurang in 3 months. 4/23/24: Functioning well, min pain but does not feel as good as his left knee.  Cont HEP, return in 3 months.  7/23/24: Min pain, occasional stiffness, cont HEP, return in 3 months.

## 2024-07-23 NOTE — PHYSICAL EXAM
[de-identified] : Effected side: right  Incision well healed Sensation intact ROM 0-120 lig stable no effusion  Left knee: 0-125

## 2024-07-23 NOTE — HISTORY OF PRESENT ILLNESS
[de-identified] : 7/23/24: Doing home exercises, seeing improvement.  Overall min pain but not as good as his left TKA yet.   Previous doc: Mr. Donte Figueroa, a 56-year-old male, with history of L TKA in 2018, presents today for right knee pain for 2 months. Reports pain is localized to lateral, medial, and posterior portions of knee. Notes pain feels similar to pain prior to L TKA. Reports using Aleve with some improvement. 11/23/21: 2 weeks relief from inj then pain returned. Very swollen today. 12/7/21: F/U MRI right knee. Cont pain. 1/4/22: Continued pain in the right knee and loss of ROM 2/1/22: Cont pain, TKA was granted w/o prejudice. 3/4/22: continued pain, awaiting surgery.  5/10/22: Here for f/up of his right knee. He spoke with the  2 weeks ago and was informed a deposition will be set up with Dr. Cartagena to determine auth for R TKA. Admits to some swelling and continued pain today 8/9/22: continued and worsening pain in right knee- Waiting for surg auth. Asp/inj helped for a few weeks but continued pain.  11/22/22: 2 weeks postop R TKA doing okay with controlled  pain. No fevers/chills. Only has had 4 sessions of at home PT.  12/20/22: 6 weeks postop R TKA continues to have pain but still has stiffness. He has been going to PT with good benefit but is struggling with ROM.  1/10/23: Feeling better since last time but still stiff. 3/3/23: 4 months postop R TKA - He is overall doing well but he does still have some stiffness. He has not been able to get approval for TKA 4/25/23: 5 months s/p R TKA - He is overall doing well but he continues to have some pain, especially with prolonged walking. He has transitioned to going to the gym. 7/25/23: 8 months s/p R TKA. Pt is feeling good- some pain with physical activity. Taking tylenol or aleeve for pain when needed. Pt ambulates freely. Pt is still going to the gym and doing HEP.  10/31/23: 1 year s/p R TKA. Hx of L TKA 5 years ago. No pain in the RT knee- but some tightness and stiffness. Feels his right knee is significantly improving but not quite as good as the left yet. Overall very happy and satisfied with both. 1/30/24: Over 1 year s/p R TKA. Hx of L TKA 5 years ago also by me. Overall very happy with progression- feels the tightness in the knee continues to improve- not quite as good as the left yet but satisfied with both. States he is active, uses bike and does HEP.  4/23/24: No change, min pain.  Does HEP.  Denies using medications for knee pain.

## 2024-07-23 NOTE — DISCUSSION/SUMMARY
[de-identified] : The patient was advised of the diagnosis.  The natural history of the pathology was explained in full to the patient in layman's terms. All questions were answered.  The risks and benefits of surgical and non-surgical treatment alternatives were explained in full to the patient.  I saw the patient under the supervision of Dr. Cartagena and followed his plan of care.

## 2024-08-02 NOTE — OCCUPATIONAL THERAPY INITIAL EVALUATION ADULT - LEVEL OF INDEPENDENCE, OT EVAL
Pt dc per orders.  [facility] to transport to [facility].  Pt off floor at this time.    independent

## 2024-10-22 ENCOUNTER — APPOINTMENT (OUTPATIENT)
Dept: ORTHOPEDIC SURGERY | Facility: CLINIC | Age: 59
End: 2024-10-22
Payer: OTHER MISCELLANEOUS

## 2024-10-22 DIAGNOSIS — M17.11 UNILATERAL PRIMARY OSTEOARTHRITIS, RIGHT KNEE: ICD-10-CM

## 2024-10-22 DIAGNOSIS — Z96.651 PRESENCE OF RIGHT ARTIFICIAL KNEE JOINT: ICD-10-CM

## 2024-10-22 PROCEDURE — 99213 OFFICE O/P EST LOW 20 MIN: CPT

## 2024-12-02 ENCOUNTER — APPOINTMENT (OUTPATIENT)
Dept: ORTHOPEDIC SURGERY | Facility: CLINIC | Age: 59
End: 2024-12-02
Payer: OTHER MISCELLANEOUS

## 2024-12-02 DIAGNOSIS — M18.11 UNILATERAL PRIMARY OSTEOARTHRITIS OF FIRST CARPOMETACARPAL JOINT, RIGHT HAND: ICD-10-CM

## 2024-12-02 PROCEDURE — 99214 OFFICE O/P EST MOD 30 MIN: CPT | Mod: 25

## 2024-12-02 PROCEDURE — 20600 DRAIN/INJ JOINT/BURSA W/O US: CPT | Mod: F5

## 2024-12-02 PROCEDURE — 73110 X-RAY EXAM OF WRIST: CPT | Mod: RT

## 2025-01-06 ENCOUNTER — APPOINTMENT (OUTPATIENT)
Dept: ORTHOPEDIC SURGERY | Facility: CLINIC | Age: 60
End: 2025-01-06
Payer: OTHER MISCELLANEOUS

## 2025-01-06 DIAGNOSIS — M18.11 UNILATERAL PRIMARY OSTEOARTHRITIS OF FIRST CARPOMETACARPAL JOINT, RIGHT HAND: ICD-10-CM

## 2025-01-06 PROCEDURE — 99214 OFFICE O/P EST MOD 30 MIN: CPT

## 2025-01-07 ENCOUNTER — APPOINTMENT (OUTPATIENT)
Dept: ORTHOPEDIC SURGERY | Facility: CLINIC | Age: 60
End: 2025-01-07
Payer: OTHER MISCELLANEOUS

## 2025-01-14 ENCOUNTER — APPOINTMENT (OUTPATIENT)
Dept: ORTHOPEDIC SURGERY | Facility: CLINIC | Age: 60
End: 2025-01-14
Payer: OTHER MISCELLANEOUS

## 2025-01-14 DIAGNOSIS — Z96.651 PRESENCE OF RIGHT ARTIFICIAL KNEE JOINT: ICD-10-CM

## 2025-01-14 PROCEDURE — 99213 OFFICE O/P EST LOW 20 MIN: CPT | Mod: AS

## 2025-01-14 PROCEDURE — 99213 OFFICE O/P EST LOW 20 MIN: CPT

## 2025-04-08 ENCOUNTER — APPOINTMENT (OUTPATIENT)
Dept: ORTHOPEDIC SURGERY | Facility: CLINIC | Age: 60
End: 2025-04-08
Payer: OTHER MISCELLANEOUS

## 2025-04-08 DIAGNOSIS — M18.12 UNILATERAL PRIMARY OSTEOARTHRITIS OF FIRST CARPOMETACARPAL JOINT, LEFT HAND: ICD-10-CM

## 2025-04-08 DIAGNOSIS — M70.21 OLECRANON BURSITIS, RIGHT ELBOW: ICD-10-CM

## 2025-04-08 DIAGNOSIS — M18.11 UNILATERAL PRIMARY OSTEOARTHRITIS OF FIRST CARPOMETACARPAL JOINT, RIGHT HAND: ICD-10-CM

## 2025-04-08 DIAGNOSIS — M77.8 OTHER ENTHESOPATHIES, NOT ELSEWHERE CLASSIFIED: ICD-10-CM

## 2025-04-08 PROCEDURE — 99243 OFF/OP CNSLTJ NEW/EST LOW 30: CPT

## 2025-04-15 ENCOUNTER — APPOINTMENT (OUTPATIENT)
Dept: ORTHOPEDIC SURGERY | Facility: CLINIC | Age: 60
End: 2025-04-15
Payer: OTHER MISCELLANEOUS

## 2025-04-15 VITALS — WEIGHT: 222 LBS | HEIGHT: 70 IN | BODY MASS INDEX: 31.78 KG/M2

## 2025-04-15 DIAGNOSIS — Z96.651 PRESENCE OF RIGHT ARTIFICIAL KNEE JOINT: ICD-10-CM

## 2025-04-15 DIAGNOSIS — Z96.652 PRESENCE OF LEFT ARTIFICIAL KNEE JOINT: ICD-10-CM

## 2025-04-15 PROCEDURE — 99214 OFFICE O/P EST MOD 30 MIN: CPT

## (undated) DEVICE — TOURNIQUET ESMARK 6"

## (undated) DEVICE — ZIMMER PULSAVAC PLUS FAN KIT

## (undated) DEVICE — SUT STRATAFIX SYMMETRIC PDS PLUS 1 18" CTX VIOLET

## (undated) DEVICE — ORTHOALIGN PLUS UNIT

## (undated) DEVICE — SUT STRATAFIX SPIRAL PDS PLUS 2-0 45CM CT-1

## (undated) DEVICE — PREP SCRUB BRUSH W CHG 4%

## (undated) DEVICE — DRSG DERMABOND PRINEO 22CM

## (undated) DEVICE — HOOD T5 PEELAWAY

## (undated) DEVICE — DRAPE STERI-DRAPE INCISE 19X17"

## (undated) DEVICE — SUT STRATAFIX SPIRAL MONOCRYL PLUS 4-0 45CM PS-2 UNDYED

## (undated) DEVICE — SAW BLADE STRYKER SAGITTAL DUAL CUT 25X90X1.27MM

## (undated) DEVICE — SAW BLADE STRYKER SAGITTAL 25X0.89X75MM

## (undated) DEVICE — SAW BLADE STRYKER SAGITTAL DUAL CUT 18X90X1.19MM

## (undated) DEVICE — DRSG TELFA 3 X 8

## (undated) DEVICE — DRSG ACE BANDAGE 6"

## (undated) DEVICE — SOL IRR BAG NS 0.9% 3000ML

## (undated) DEVICE — DRAPE SURGICAL #1010

## (undated) DEVICE — NDL HYPO SAFE 22G X 1.5" (BLACK)

## (undated) DEVICE — WARMING BLANKET UPPER ADULT

## (undated) DEVICE — MIXER BONE CEMENT EVAC III

## (undated) DEVICE — SUT VICRYL 0 27" CT-1 UNDYED

## (undated) DEVICE — DRAPE TOWEL BLUE 17" X 24"

## (undated) DEVICE — SAW BLADE STRYKER SAGITTAL DUAL CUT WITH FAN

## (undated) DEVICE — VENODYNE/SCD SLEEVE CALF MEDIUM

## (undated) DEVICE — SUCTION TIP KAMVAC MINI

## (undated) DEVICE — FRA-ESU BOVIE FORCE TRIAD T7J19745DX: Type: DURABLE MEDICAL EQUIPMENT

## (undated) DEVICE — CRYO/CUFF GRAVITY COOLER KNEE LARGE

## (undated) DEVICE — MEDICATION LABELS W MARKER

## (undated) DEVICE — SYR LUER LOK 20CC

## (undated) DEVICE — PACK TOTAL KNEE

## (undated) DEVICE — SAW BLADE BRASSELER LARGE BONE THK 75X25X0.89MM

## (undated) DEVICE — GLV 8.5 PROTEXIS (WHITE)